# Patient Record
Sex: FEMALE | Race: WHITE | Employment: OTHER | RURAL
[De-identification: names, ages, dates, MRNs, and addresses within clinical notes are randomized per-mention and may not be internally consistent; named-entity substitution may affect disease eponyms.]

---

## 2017-02-10 ENCOUNTER — OFFICE VISIT (OUTPATIENT)
Dept: FAMILY MEDICINE CLINIC | Age: 57
End: 2017-02-10

## 2017-02-10 VITALS
RESPIRATION RATE: 22 BRPM | HEART RATE: 95 BPM | DIASTOLIC BLOOD PRESSURE: 62 MMHG | HEIGHT: 67 IN | SYSTOLIC BLOOD PRESSURE: 110 MMHG | BODY MASS INDEX: 37.35 KG/M2 | TEMPERATURE: 97.3 F | OXYGEN SATURATION: 98 % | WEIGHT: 238 LBS

## 2017-02-10 DIAGNOSIS — M25.50 ARTHRALGIA, UNSPECIFIED JOINT: ICD-10-CM

## 2017-02-10 DIAGNOSIS — E07.9 THYROID DISEASE: ICD-10-CM

## 2017-02-10 DIAGNOSIS — Z23 ENCOUNTER FOR IMMUNIZATION: Primary | ICD-10-CM

## 2017-02-10 DIAGNOSIS — G47.00 INSOMNIA, UNSPECIFIED TYPE: ICD-10-CM

## 2017-02-10 DIAGNOSIS — I10 ESSENTIAL HYPERTENSION: ICD-10-CM

## 2017-02-10 DIAGNOSIS — F32.A DEPRESSION, UNSPECIFIED DEPRESSION TYPE: ICD-10-CM

## 2017-02-10 RX ORDER — AMLODIPINE BESYLATE 5 MG/1
TABLET ORAL
Qty: 30 TAB | Refills: 6 | Status: SHIPPED | OUTPATIENT
Start: 2017-02-10 | End: 2017-10-11 | Stop reason: SDUPTHER

## 2017-02-10 RX ORDER — LISINOPRIL AND HYDROCHLOROTHIAZIDE 12.5; 2 MG/1; MG/1
2 TABLET ORAL DAILY
Qty: 60 TAB | Refills: 3 | Status: SHIPPED | OUTPATIENT
Start: 2017-02-10 | End: 2017-07-27 | Stop reason: SDUPTHER

## 2017-02-10 RX ORDER — ESCITALOPRAM OXALATE 20 MG/1
TABLET ORAL
Qty: 30 TAB | Refills: 0 | Status: SHIPPED | OUTPATIENT
Start: 2017-02-10 | End: 2017-02-22 | Stop reason: SDUPTHER

## 2017-02-10 RX ORDER — ATORVASTATIN CALCIUM 20 MG/1
20 TABLET, FILM COATED ORAL DAILY
Qty: 30 TAB | Refills: 0 | Status: SHIPPED | OUTPATIENT
Start: 2017-02-10 | End: 2017-05-11 | Stop reason: SDUPTHER

## 2017-02-10 RX ORDER — BUPROPION HYDROCHLORIDE 75 MG/1
75 TABLET ORAL 2 TIMES DAILY
Qty: 60 TAB | Refills: 0 | Status: SHIPPED | OUTPATIENT
Start: 2017-02-10 | End: 2017-06-09 | Stop reason: SDUPTHER

## 2017-02-10 RX ORDER — MELATONIN 5 MG
5 CAPSULE ORAL
Qty: 30 CAP | Refills: 3 | Status: SHIPPED | OUTPATIENT
Start: 2017-02-10

## 2017-02-10 NOTE — MR AVS SNAPSHOT
Visit Information Date & Time Provider Department Dept. Phone Encounter #  
 2/10/2017 11:00 AM Rubi Reyes NP [facility] [address] [facility] [phone] [id number] Upcoming Health Maintenance Date Due Hepatitis C Screening 1960 Pneumococcal 19-64 Medium Risk (1 of 1 - PPSV23) 12/12/1979 DTaP/Tdap/Td series (1 - Tdap) 12/12/1981 PAP AKA CERVICAL CYTOLOGY 12/12/1981 BREAST CANCER SCRN MAMMOGRAM 12/12/2010 FOBT Q 1 YEAR AGE 50-75 12/12/2010 INFLUENZA AGE 9 TO ADULT 8/1/2016 Allergies as of 2/10/2017  Review Complete On: 2/10/2017 By: Abdi Moyer RN No Known Allergies Current Immunizations  Never Reviewed Name Date Pneumococcal Conjugate (PCV-13) 2/10/2017 Tdap  Incomplete Not reviewed this visit You Were Diagnosed With   
  
 Codes Comments Encounter for immunization    -  Primary ICD-10-CM: Y63 ICD-9-CM: V03.89 Insomnia, unspecified type     ICD-10-CM: G47.00 ICD-9-CM: 780.52 Thyroid disease     ICD-10-CM: E07.9 ICD-9-CM: 246.9 Arthralgia, unspecified joint     ICD-10-CM: M25.50 ICD-9-CM: 719.40 Essential hypertension     ICD-10-CM: I10 
ICD-9-CM: 401.9 Depression, unspecified depression type     ICD-10-CM: F32.9 ICD-9-CM: 388 Vitals BP Pulse Temp Resp Height(growth percentile) 110/62 (BP 1 Location: Left arm, BP Patient Position: Sitting) 95 97.3 °F (36.3 °C) (Temporal) 22 5' 7\" (1.702 m) Weight(growth percentile) SpO2 BMI OB Status Smoking Status 238 lb (108 kg) 98% 37.28 kg/m2 Menopause Current Every Day Smoker BMI and BSA Data Body Mass Index Body Surface Area  
 37.28 kg/m 2 2.26 m 2 Preferred Pharmacy Pharmacy Name Phone [address], [facility] [place]so [place] [phone] Your Updated Medication List  
  
   
This list is accurate as of: 2/10/17 12:13 PM.  Always use your most recent med list.  
  
  
  
  
 amLODIPine 5 mg tablet Commonly known as:  Raffi Churchill TAKE ONE TABLET BY MOUTH EVERY DAY  
  
 atorvastatin 20 mg tablet Commonly known as:  LIPITOR Take 1 Tab by mouth daily. Needs follow up  
  
 buPROPion 75 mg tablet Commonly known as:  [facility] Take 1 Tab by mouth two (2) times a day. Needs follow up  
  
 escitalopram oxalate 20 mg tablet Commonly known as:  Jose Trudy TAKE ONE TABLET BY MOUTH EVERY DAY  
  
 lisinopril-hydroCHLOROthiazide 20-12.5 mg per tablet Commonly known as:  Michael Del Real Take 2 Tabs by mouth daily. melatonin 5 mg Cap capsule Take 1 Cap by mouth nightly. Indications: INSOMNIA Prescriptions Sent to Pharmacy Refills  
 melatonin 5 mg cap capsule 3 Sig: Take 1 Cap by mouth nightly. Indications: INSOMNIA Class: Normal  
 Pharmacy: [address] [place] [address] Ph #: [phone] Route: Oral  
 atorvastatin (LIPITOR) 20 mg tablet 0 Sig: Take 1 Tab by mouth daily. Needs follow up Class: Normal  
 Pharmacy: [address] [place] [address] Ph #: [phone] Route: Oral  
 buPROPion (WELLBUTRIN) 75 mg tablet 0 Sig: Take 1 Tab by mouth two (2) times a day. Needs follow up Class: Normal  
 Pharmacy: [address] [place] [address] Ph #: [phone] Route: Oral  
 escitalopram oxalate (LEXAPRO) 20 mg tablet 0 Sig: TAKE ONE TABLET BY MOUTH EVERY DAY Class: Normal  
 Pharmacy: [address] [place] [address] Ph #: [phone]  
 lisinopril-hydroCHLOROthiazide (PRINZIDE, ZESTORETIC) 20-12.5 mg per tablet 3 Sig: Take 2 Tabs by mouth daily. Class: Normal  
 Pharmacy: [address] [place] [address] Ph #: [phone] Route: Oral  
 amLODIPine (NORVASC) 5 mg tablet 6 Sig: TAKE ONE TABLET BY MOUTH EVERY DAY  Class: Normal  
 Pharmacy: [address] [facility], [address] [facility] #: [phone] We Performed the Following ADMIN PNEUMOCOCCAL VACCINE [ HCPCS] PNEUMOCOCCAL CONJ VACCINE 13 VALENT IM [id number] CPT(R)] TETANUS, DIPHTHERIA TOXOIDS AND ACELLULAR PERTUSSIS VACCINE (TDAP), IN INDIVIDS. >=7, IM [id number] CPT(R)] Introducing [facility]! Dru Adame introduces [employer] patient portal. Now you can access parts of your medical record, email your doctor's office, and request medication refills online. 1. In your internet browser, go to [email or web address] 2. Click on the First Time User? Click Here link in the Sign In box. You will see the New Member Sign Up page. 3. Enter your [employer] Access Code exactly as it appears below. You will not need to use this code after youve completed the sign-up process. If you do not sign up before the expiration date, you must request a new code. · [employer] Access Code: [email or web address] Expires: 5/11/2017 12:13 PM 
 
4. Enter the last four digits of your Social Security Number (xxxx) and Date of Birth (mm/dd/yyyy) as indicated and click Submit. You will be taken to the next sign-up page. 5. Create a [employer] ID. This will be your [employer] login ID and cannot be changed, so think of one that is secure and easy to remember. 6. Create a [employer] password. You can change your password at any time. 7. Enter your Password Reset Question and Answer. This can be used at a later time if you forget your password. 8. Enter your e-mail address. You will receive e-mail notification when new information is available in [address] 9. Click Sign Up. You can now view and download portions of your medical record. 10. Click the Download Summary menu link to download a portable copy of your medical information.  
 
If you have questions, please visit the Frequently Asked Questions section of the [employer]. Remember, [employer] is NOT to be used for urgent needs. For medical emergencies, dial 911. Now available from your iPhone and Android! Please provide this summary of care documentation to your next provider. Your primary care clinician is listed as Samm Weber. If you have any questions after today's visit, please call [phone].

## 2017-02-11 NOTE — PROGRESS NOTES
Subjective:     Saurabh To is a 64 y.o. female who presents today with the following:  Chief Complaint   Patient presents with    Hypertension     checkup    Thyroid Problem   HPI: Jeannette Giordano presents today follow up for chronic medical conditions. Compliant with medications. Insomnia: Hard to get to sleep ans stay asleep. Usually wakes up with knee pain. Hx of multiple surgeries and MRSA. Requests trazodone for sleep. We discussed melatonin.  referral for mammogram        referral for colonoscopy        Needs to schedule a pap          Wants pneumococcal and Tdap today    ROS:  Gen: denies fever, chills, fatigue, weight loss, weight gain  HEENT:denies blurry vision, nasal congestion, sore throat  Resp: denies dypsnea, cough, wheezing  CV: denies chest pain radiating to the jaws or arms, palpitations, lower extremity edema  Abd: denies nausea, vomiting, diarrhea, constipation  Neuro: denies numbness/tingling  Endo: denies polyuria, polydipsia, heat/cold intolerance  Heme: no lymphadenopathy    No Known Allergies      Current Outpatient Prescriptions:     melatonin 5 mg cap capsule, Take 1 Cap by mouth nightly. Indications: INSOMNIA, Disp: 30 Cap, Rfl: 3    atorvastatin (LIPITOR) 20 mg tablet, Take 1 Tab by mouth daily. Needs follow up, Disp: 30 Tab, Rfl: 0    buPROPion (WELLBUTRIN) 75 mg tablet, Take 1 Tab by mouth two (2) times a day. Needs follow up, Disp: 60 Tab, Rfl: 0    escitalopram oxalate (LEXAPRO) 20 mg tablet, TAKE ONE TABLET BY MOUTH EVERY DAY, Disp: 30 Tab, Rfl: 0    lisinopril-hydroCHLOROthiazide (PRINZIDE, ZESTORETIC) 20-12.5 mg per tablet, Take 2 Tabs by mouth daily. , Disp: 60 Tab, Rfl: 3    amLODIPine (NORVASC) 5 mg tablet, TAKE ONE TABLET BY MOUTH EVERY DAY, Disp: 30 Tab, Rfl: 6    Past Medical History   Diagnosis Date    Anxiety     Biceps tendon tear     Depression     Frequent headaches     Hypertension     Joint pain     Kidney stones     MRSA infection      knee    Palpitations     Thyroid disease      thyroid problem nodules on thyroid       Past Surgical History   Procedure Laterality Date    Hx  section       3 of them    Hx orthopaedic       torn knee left and right knee ,carpal tunnel    Hx orthopaedic       CTS, lt shoulder    Hx colonoscopy  2004     Pt report, polyps [facility]       History   Smoking Status    Current Every Day Smoker    Packs/day: 1.00    Types: Cigarettes   Smokeless Tobacco    Not on file       Social History     Social History    Marital status:      Spouse name: N/A    Number of children: N/A    Years of education: N/A     Social History Main Topics    Smoking status: Current Every Day Smoker     Packs/day: 1.00     Types: Cigarettes    Smokeless tobacco: Not on file    Alcohol use 0.0 oz/week     0 Standard drinks or equivalent per week      Comment: 5-6 drinks a week    Drug use: No    Sexual activity: Yes     Other Topics Concern    Not on file     Social History Narrative       Family History   Problem Relation Age of Onset    Cancer Mother     Hypertension Mother     Diabetes Father     Parkinson's Disease Father     Hypertension Brother     Hypertension Maternal Grandmother     Stroke Maternal Grandmother     Heart Disease Paternal Grandmother     Cancer Other      luekemia         Objective:     Visit Vitals    /62 (BP 1 Location: Left arm, BP Patient Position: Sitting)    Pulse 95    Temp 97.3 °F (36.3 °C) (Temporal)    Resp 22    Ht 5' 7\" (1.702 m)    Wt 238 lb (108 kg)    SpO2 98%    BMI 37.28 kg/m2     Body mass index is 37.28 kg/(m^2). General: Alert and oriented. No acute distress. Well nourished  HEENT :  Ears:TMs are normal. Canals are clear. Eyes: pupils equal, round, react to light and accommodation. Extra ocular movements intact. Nose: patent. Mouth and throat is clear. Neck:supple full range of motion no thyromegaly. Trachea midline, No carotid bruits. No significant lymphadenopathy  Lungs[de-identified] clear to auscultation without wheezes, rales, or rhonchi. Heart :RRR, S1 & S2 are normal intensity. No murmur; no gallop  Abdomen: bowel sounds active. No tenderness, guarding, rebound, masses, hepatic or spleen enlargement  Back: no CVA tenderness. Extremities: without clubbing, cyanosis, or edema  Pulses: radial and femoral pulses are normal  Neuro: HMF intact. Cranial nerves II through XII grossly normal.  Motor: is 5 over 5 and symmetrical.   Deep tendon reflexes: +2 equal    Results for orders placed or performed in visit on 07/26/16   TSH 3RD GENERATION   Result Value Ref Range    TSH 1.040 0.450 - 4.500 uIU/mL   LIPID PANEL   Result Value Ref Range    Cholesterol, total 192 100 - 199 mg/dL    Triglyceride 140 0 - 149 mg/dL    HDL Cholesterol 52 >39 mg/dL    VLDL, calculated 28 5 - 40 mg/dL    LDL, calculated 112 (H) 0 - 99 mg/dL   AST   Result Value Ref Range    AST (SGOT) 22 0 - 40 IU/L   SPECIMEN STATUS REPORT   Result Value Ref Range    SPECIMEN STATUS REPORT COMMENT        No results found for this visit on 02/10/17. Assessment/ Plan:     Jeannette Giordano was seen today for hypertension and thyroid problem. Diagnoses and all orders for this visit:    Encounter for immunization  -     PNEUMOCOCCAL CONJ VACCINE 13 VALENT IM  -     TETANUS, DIPHTHERIA TOXOIDS AND ACELLULAR PERTUSSIS VACCINE (TDAP), IN INDIVIDS. >=7, IM  -     ADMIN PNEUMOCOCCAL VACCINE    Insomnia, unspecified type    Thyroid disease    Arthralgia, unspecified joint    Essential hypertension    Depression, unspecified depression type    Other orders  -     melatonin 5 mg cap capsule; Take 1 Cap by mouth nightly. Indications: INSOMNIA  -     atorvastatin (LIPITOR) 20 mg tablet; Take 1 Tab by mouth daily. Needs follow up  -     buPROPion (WELLBUTRIN) 75 mg tablet; Take 1 Tab by mouth two (2) times a day.  Needs follow up  -     escitalopram oxalate (LEXAPRO) 20 mg tablet; TAKE ONE TABLET BY MOUTH EVERY DAY  -     lisinopril-hydroCHLOROthiazide (PRINZIDE, ZESTORETIC) 20-12.5 mg per tablet; Take 2 Tabs by mouth daily. -     amLODIPine (NORVASC) 5 mg tablet; TAKE ONE TABLET BY MOUTH EVERY DAY         1. Encounter for immunization    2. Insomnia, unspecified type    3. Thyroid disease    4. Arthralgia, unspecified joint    5. Essential hypertension    6. Depression, unspecified depression type        Orders Placed This Encounter    PNEUMOCOCCAL CONJ VACCINE 13 VALENT IM    TETANUS, DIPHTHERIA TOXOIDS AND ACELLULAR PERTUSSIS VACCINE (TDAP), IN INDIVIDS. >=7, IM    ADMIN PNEUMOCOCCAL VACCINE    melatonin 5 mg cap capsule     Sig: Take 1 Cap by mouth nightly. Indications: INSOMNIA     Dispense:  30 Cap     Refill:  3    atorvastatin (LIPITOR) 20 mg tablet     Sig: Take 1 Tab by mouth daily. Needs follow up     Dispense:  30 Tab     Refill:  0    buPROPion (WELLBUTRIN) 75 mg tablet     Sig: Take 1 Tab by mouth two (2) times a day. Needs follow up     Dispense:  60 Tab     Refill:  0    escitalopram oxalate (LEXAPRO) 20 mg tablet     Sig: TAKE ONE TABLET BY MOUTH EVERY DAY     Dispense:  30 Tab     Refill:  0    lisinopril-hydroCHLOROthiazide (PRINZIDE, ZESTORETIC) 20-12.5 mg per tablet     Sig: Take 2 Tabs by mouth daily. Dispense:  60 Tab     Refill:  3    amLODIPine (NORVASC) 5 mg tablet     Sig: TAKE ONE TABLET BY MOUTH EVERY DAY     Dispense:  30 Tab     Refill:  6     Insomnia discussed trying melatonin at night. Verbal and written instructions (see AVS) provided.  Patient expresses understanding of diagnosis and treatment plan.     JERRELL Nova

## 2017-02-22 RX ORDER — ESCITALOPRAM OXALATE 20 MG/1
TABLET ORAL
Qty: 30 TAB | Refills: 0 | Status: SHIPPED | OUTPATIENT
Start: 2017-02-22 | End: 2017-02-28 | Stop reason: SDUPTHER

## 2017-02-28 RX ORDER — ESCITALOPRAM OXALATE 20 MG/1
TABLET ORAL
Qty: 30 TAB | Refills: 0 | Status: SHIPPED | OUTPATIENT
Start: 2017-02-28 | End: 2017-06-02 | Stop reason: SDUPTHER

## 2017-04-21 ENCOUNTER — HOSPITAL ENCOUNTER (OUTPATIENT)
Dept: LAB | Age: 57
Discharge: HOME | End: 2017-04-21
Payer: COMMERCIAL

## 2017-04-21 ENCOUNTER — OFFICE VISIT (OUTPATIENT)
Dept: FAMILY MEDICINE CLINIC | Age: 57
End: 2017-04-21

## 2017-04-21 VITALS
SYSTOLIC BLOOD PRESSURE: 114 MMHG | HEIGHT: 67 IN | HEART RATE: 78 BPM | TEMPERATURE: 97.8 F | WEIGHT: 235.4 LBS | BODY MASS INDEX: 36.95 KG/M2 | DIASTOLIC BLOOD PRESSURE: 66 MMHG | RESPIRATION RATE: 20 BRPM

## 2017-04-21 DIAGNOSIS — Z00.00 HEALTH MAINTENANCE EXAMINATION: ICD-10-CM

## 2017-04-21 DIAGNOSIS — Z01.419 WOMEN'S ANNUAL ROUTINE GYNECOLOGICAL EXAMINATION: Primary | ICD-10-CM

## 2017-04-21 PROCEDURE — 88175 CYTOPATH C/V AUTO FLUID REDO: CPT | Performed by: NURSE PRACTITIONER

## 2017-04-21 PROCEDURE — 87624 HPV HI-RISK TYP POOLED RSLT: CPT | Performed by: NURSE PRACTITIONER

## 2017-04-21 NOTE — PROGRESS NOTES
Subjective:   64 y.o. female for Well Woman Check. No LMP recorded. Patient is not currently having periods (Reason: Menopause). Social History: has sex with males. Pertinent past medical hstory: HTN, Thyroid Disease and anxiety. Patient Active Problem List    Diagnosis Date Noted    Depression     Hypertension     Joint pain     Anxiety     Thyroid disease      Current Outpatient Prescriptions   Medication Sig Dispense Refill    escitalopram oxalate (LEXAPRO) 20 mg tablet TAKE ONE TABLET BY MOUTH EVERY DAY 30 Tab 0    melatonin 5 mg cap capsule Take 1 Cap by mouth nightly. Indications: INSOMNIA 30 Cap 3    atorvastatin (LIPITOR) 20 mg tablet Take 1 Tab by mouth daily. Needs follow up 30 Tab 0    buPROPion (WELLBUTRIN) 75 mg tablet Take 1 Tab by mouth two (2) times a day. Needs follow up 60 Tab 0    lisinopril-hydroCHLOROthiazide (PRINZIDE, ZESTORETIC) 20-12.5 mg per tablet Take 2 Tabs by mouth daily. 60 Tab 3    amLODIPine (NORVASC) 5 mg tablet TAKE ONE TABLET BY MOUTH EVERY DAY 30 Tab 6     No Known Allergies  Past Medical History:   Diagnosis Date    Anxiety     Biceps tendon tear     Depression     Frequent headaches     Hypertension     Joint pain     Kidney stones     MRSA infection     knee    Palpitations     Thyroid disease     thyroid problem nodules on thyroid     Past Surgical History:   Procedure Laterality Date    HX  SECTION      3 of them    HX COLONOSCOPY  2004    Pt report, polyps [facility]    HX ORTHOPAEDIC      torn knee left and right knee ,carpal tunnel    HX ORTHOPAEDIC      CTS, lt shoulder     Family History   Problem Relation Age of Onset    Cancer Mother     Hypertension Mother     Diabetes Father     Parkinson's Disease Father     Hypertension Brother     Hypertension Maternal Grandmother     Stroke Maternal Grandmother     Heart Disease Paternal Grandmother     Cancer Other      luekemia        ROS:  Feeling well.  No dyspnea or chest pain on exertion. No abdominal pain, change in bowel habits, black or bloody stools. No urinary tract symptoms. GYN ROS: no breast pain or new or enlarging lumps on self exam, no vaginal bleeding. No neurological complaints. Objective:     Visit Vitals    /66 (BP 1 Location: Right arm, BP Patient Position: Sitting)    Pulse 78    Temp 97.8 °F (36.6 °C) (Temporal)    Resp 20    Ht 5' 7\" (1.702 m)    Wt 235 lb 6.4 oz (106.8 kg)    BMI 36.87 kg/m2     The patient appears well, alert, oriented x 3, in no distress. ENT normal.  Neck supple. No adenopathy or thyromegaly. DEA. Lungs are clear, good air entry, no wheezes, rhonchi or rales. S1 and S2 normal, no murmurs, regular rate and rhythm. Abdomen soft without tenderness, guarding, mass or organomegaly. Extremities show no edema, normal peripheral pulses. Neurological is normal, no focal findings. BREAST EXAM: breasts appear normal, no suspicious masses, no skin or nipple changes or axillary nodes    PELVIC EXAM: normal external genitalia, vulva, vagina, cervix, uterus and adnexa    Assessment/Plan:   well woman  pap smear  counseled on breast self exam, menopause, osteoporosis and adequate intake of calcium and vitamin D  return annually or prn  Encounter Diagnoses   Name Primary?  Women's annual routine gynecological examination Yes    Health maintenance examination      Orders Placed This Encounter    OCCULT BLOOD, IMMUNOASSAY (FIT)    PAP IG, APTIMA HPV AND RFX 16/18,45 ([id number])   . RTO in 6 months for chronic disease evaluation and management.

## 2017-04-21 NOTE — MR AVS SNAPSHOT
Visit Information Date & Time Provider Department Dept. Phone Encounter #  
 4/21/2017  8:00 AM Sofie Snellen, NP [address] [phone] [id number] Upcoming Health Maintenance Date Due Hepatitis C Screening 1960 Pneumococcal 19-64 Medium Risk (1 of 1 - PPSV23) 12/12/1979 PAP AKA CERVICAL CYTOLOGY 12/12/1981 BREAST CANCER SCRN MAMMOGRAM 12/12/2010 FOBT Q 1 YEAR AGE 50-75 12/12/2010 INFLUENZA AGE 9 TO ADULT 8/1/2016 DTaP/Tdap/Td series (2 - Td) 2/10/2027 Allergies as of 4/21/2017  Review Complete On: 4/21/2017 By: Sofie Snellen, NP No Known Allergies Current Immunizations  Never Reviewed Name Date Pneumococcal Conjugate (PCV-13) 2/10/2017 Tdap 2/10/2017 Not reviewed this visit You Were Diagnosed With   
  
 Codes Comments Women's annual routine gynecological examination    -  Primary ICD-10-CM: W20.999 ICD-9-CM: V72.31 Vitals BP Pulse Temp Resp Height(growth percentile) 114/66 (BP 1 Location: Right arm, BP Patient Position: Sitting) 78 97.8 °F (36.6 °C) (Temporal) 20 5' 7\" (1.702 m) Weight(growth percentile) BMI OB Status Smoking Status 235 lb 6.4 oz (106.8 kg) 36.87 kg/m2 Menopause Current Every Day Smoker Vitals History BMI and BSA Data Body Mass Index Body Surface Area  
 36.87 kg/m 2 2.25 m 2 Preferred Pharmacy Pharmacy Name Phone [address], [facility] [place] Al[place] [phone] Your Updated Medication List  
  
   
This list is accurate as of: 4/21/17  8:34 AM.  Always use your most recent med list. amLODIPine 5 mg tablet Commonly known as:  Sameul Felicia TAKE ONE TABLET BY MOUTH EVERY DAY  
  
 atorvastatin 20 mg tablet Commonly known as:  LIPITOR Take 1 Tab by mouth daily. Needs follow up  
  
 buPROPion 75 mg tablet Commonly known as:  [facility]  
 Take 1 Tab by mouth two (2) times a day. Needs follow up  
  
 escitalopram oxalate 20 mg tablet Commonly known as:  Aleatha Bernheim TAKE ONE TABLET BY MOUTH EVERY DAY  
  
 lisinopril-hydroCHLOROthiazide 20-12.5 mg per tablet Commonly known as:  Juhia  Take 2 Tabs by mouth daily. melatonin 5 mg Cap capsule Take 1 Cap by mouth nightly. Indications: INSOMNIA We Performed the Following PAP IG, APTIMA HPV AND RFX 16/18,45 ([id number]) [[id number] Custom] Introducing [facility]! [facility] introduces [employer] patient portal. Now you can access parts of your medical record, email your doctor's office, and request medication refills online. 1. In your internet browser, go to [email or web address] 2. Click on the First Time User? Click Here link in the Sign In box. You will see the New Member Sign Up page. 3. Enter your [employer] Access Code exactly as it appears below. You will not need to use this code after youve completed the sign-up process. If you do not sign up before the expiration date, you must request a new code. · [employer] Access Code: [email or web address] Expires: 5/11/2017  1:13 PM 
 
4. Enter the last four digits of your Social Security Number (xxxx) and Date of Birth (mm/dd/yyyy) as indicated and click Submit. You will be taken to the next sign-up page. 5. Create a [employer] ID. This will be your [employer] login ID and cannot be changed, so think of one that is secure and easy to remember. 6. Create a [employer] password. You can change your password at any time. 7. Enter your Password Reset Question and Answer. This can be used at a later time if you forget your password. 8. Enter your e-mail address. You will receive e-mail notification when new information is available in [address] 9. Click Sign Up. You can now view and download portions of your medical record.  
10. Click the Download Summary menu link to download a portable copy of your medical information. If you have questions, please visit the Frequently Asked Questions section of the [employer] website. Remember, [place] [facility] is NOT to be used for urgent needs. For medical emergencies, dial 911. Now available from your iPhone and Android! Please provide this summary of care documentation to your next provider. Your primary care clinician is listed as Jessica Holliday. If you have any questions after today's visit, please call [phone].

## 2017-04-22 LAB — HEMOCCULT STL QL IA: NEGATIVE

## 2017-05-12 RX ORDER — ATORVASTATIN CALCIUM 20 MG/1
20 TABLET, FILM COATED ORAL DAILY
Qty: 30 TAB | Refills: 0 | Status: SHIPPED | OUTPATIENT
Start: 2017-05-12 | End: 2017-06-09 | Stop reason: SDUPTHER

## 2017-06-02 RX ORDER — ESCITALOPRAM OXALATE 20 MG/1
TABLET ORAL
Qty: 30 TAB | Refills: 0 | Status: SHIPPED | OUTPATIENT
Start: 2017-06-02 | End: 2017-06-26 | Stop reason: SDUPTHER

## 2017-06-09 RX ORDER — BUPROPION HYDROCHLORIDE 75 MG/1
75 TABLET ORAL 2 TIMES DAILY
Qty: 180 TAB | Refills: 1 | Status: SHIPPED | OUTPATIENT
Start: 2017-06-09 | End: 2017-11-27 | Stop reason: SDUPTHER

## 2017-06-09 RX ORDER — ATORVASTATIN CALCIUM 20 MG/1
20 TABLET, FILM COATED ORAL DAILY
Qty: 90 TAB | Refills: 1 | Status: SHIPPED | OUTPATIENT
Start: 2017-06-09 | End: 2017-11-27 | Stop reason: SDUPTHER

## 2017-06-09 NOTE — TELEPHONE ENCOUNTER
Lab Results   Component Value Date/Time    Cholesterol, total 192 07/26/2016 10:31 AM    HDL Cholesterol 52 07/26/2016 10:31 AM    LDL, calculated 112 07/26/2016 10:31 AM    VLDL, calculated 28 07/26/2016 10:31 AM    Triglyceride 140 07/26/2016 10:31 AM

## 2017-06-26 RX ORDER — ESCITALOPRAM OXALATE 20 MG/1
TABLET ORAL
Qty: 30 TAB | Refills: 3 | Status: SHIPPED | OUTPATIENT
Start: 2017-06-26 | End: 2017-10-31 | Stop reason: SDUPTHER

## 2017-07-27 RX ORDER — LISINOPRIL AND HYDROCHLOROTHIAZIDE 12.5; 2 MG/1; MG/1
2 TABLET ORAL DAILY
Qty: 60 TAB | Refills: 3 | Status: SHIPPED | OUTPATIENT
Start: 2017-07-27 | End: 2017-11-22 | Stop reason: SDUPTHER

## 2017-10-11 RX ORDER — AMLODIPINE BESYLATE 5 MG/1
TABLET ORAL
Qty: 30 TAB | Refills: 0 | Status: SHIPPED | OUTPATIENT
Start: 2017-10-11 | End: 2017-11-09 | Stop reason: SDUPTHER

## 2017-11-09 RX ORDER — AMLODIPINE BESYLATE 5 MG/1
TABLET ORAL
Qty: 30 TAB | Refills: 0 | Status: SHIPPED | OUTPATIENT
Start: 2017-11-09 | End: 2017-11-27 | Stop reason: SDUPTHER

## 2017-11-24 RX ORDER — LISINOPRIL AND HYDROCHLOROTHIAZIDE 12.5; 2 MG/1; MG/1
TABLET ORAL
Qty: 60 TAB | Refills: 0 | Status: SHIPPED | OUTPATIENT
Start: 2017-11-24 | End: 2017-11-27 | Stop reason: SDUPTHER

## 2017-11-27 ENCOUNTER — OFFICE VISIT (OUTPATIENT)
Dept: FAMILY MEDICINE CLINIC | Age: 57
End: 2017-11-27

## 2017-11-27 VITALS
TEMPERATURE: 96.9 F | DIASTOLIC BLOOD PRESSURE: 70 MMHG | WEIGHT: 233.4 LBS | BODY MASS INDEX: 36.63 KG/M2 | RESPIRATION RATE: 16 BRPM | HEART RATE: 73 BPM | OXYGEN SATURATION: 96 % | SYSTOLIC BLOOD PRESSURE: 106 MMHG | HEIGHT: 67 IN

## 2017-11-27 DIAGNOSIS — E07.9 THYROID DISEASE: ICD-10-CM

## 2017-11-27 DIAGNOSIS — I10 ESSENTIAL HYPERTENSION: Primary | ICD-10-CM

## 2017-11-27 DIAGNOSIS — Z12.39 SCREENING FOR BREAST CANCER: ICD-10-CM

## 2017-11-27 DIAGNOSIS — Z23 ENCOUNTER FOR IMMUNIZATION: ICD-10-CM

## 2017-11-27 RX ORDER — ATORVASTATIN CALCIUM 20 MG/1
20 TABLET, FILM COATED ORAL DAILY
Qty: 90 TAB | Refills: 1 | Status: SHIPPED | OUTPATIENT
Start: 2017-11-27 | End: 2018-06-08 | Stop reason: SDUPTHER

## 2017-11-27 RX ORDER — AMLODIPINE BESYLATE 5 MG/1
TABLET ORAL
Qty: 90 TAB | Refills: 1 | Status: SHIPPED | OUTPATIENT
Start: 2017-11-27 | End: 2018-06-08 | Stop reason: SDUPTHER

## 2017-11-27 RX ORDER — ESCITALOPRAM OXALATE 20 MG/1
TABLET ORAL
Qty: 90 TAB | Refills: 1 | Status: SHIPPED | OUTPATIENT
Start: 2017-11-27 | End: 2018-06-28 | Stop reason: SDUPTHER

## 2017-11-27 RX ORDER — LISINOPRIL AND HYDROCHLOROTHIAZIDE 12.5; 2 MG/1; MG/1
TABLET ORAL
Qty: 180 TAB | Refills: 1 | Status: SHIPPED | OUTPATIENT
Start: 2017-11-27 | End: 2018-06-25 | Stop reason: SDUPTHER

## 2017-11-27 RX ORDER — BUPROPION HYDROCHLORIDE 75 MG/1
75 TABLET ORAL 2 TIMES DAILY
Qty: 180 TAB | Refills: 1 | Status: SHIPPED | OUTPATIENT
Start: 2017-11-27 | End: 2018-11-30 | Stop reason: SDUPTHER

## 2017-11-27 NOTE — MR AVS SNAPSHOT
Visit Information Date & Time Provider Department Dept. Phone Encounter #  
 11/27/2017 11:30 AM Selena Lange NP [place] [id number] [facility] [phone] [id number] Upcoming Health Maintenance Date Due Hepatitis C Screening 1960 Pneumococcal 19-64 Medium Risk (1 of 1 - PPSV23) 12/12/1979 BREAST CANCER SCRN MAMMOGRAM 12/12/2010 FOBT Q 1 YEAR AGE 50-75 4/21/2018 PAP AKA CERVICAL CYTOLOGY 4/21/2020 DTaP/Tdap/Td series (2 - Td) 2/10/2027 Allergies as of 11/27/2017  Review Complete On: 11/27/2017 By: Oni Griffith RN No Known Allergies Current Immunizations  Never Reviewed Name Date Influenza Vaccine Vicky Lanronak) 11/27/2017 Pneumococcal Conjugate (PCV-13) 2/10/2017 Tdap 2/10/2017 Not reviewed this visit You Were Diagnosed With   
  
 Codes Comments Encounter for immunization    -  Primary ICD-10-CM: G11 ICD-9-CM: V03.89 BMI 36.0-36.9,adult     ICD-10-CM: B11.47 
ICD-9-CM: V85.36 Vitals BP Pulse Temp Resp Height(growth percentile) 106/70 (BP 1 Location: Left arm, BP Patient Position: Sitting) 73 96.9 °F (36.1 °C) (Temporal) 16 5' 7\" (1.702 m) Weight(growth percentile) SpO2 BMI OB Status Smoking Status 233 lb 6.4 oz (105.9 kg) 96% 36.56 kg/m2 Menopause Current Every Day Smoker BMI and BSA Data Body Mass Index Body Surface Area  
 36.56 kg/m 2 2.24 m 2 Preferred Pharmacy Pharmacy Name Phone [address], [facility] [place] [facility] [phone] Your Updated Medication List  
  
   
This list is accurate as of: 11/27/17 12:48 PM.  Always use your most recent med list. amLODIPine 5 mg tablet Commonly known as:  Wandra Tessie TAKE ONE TABLET BY MOUTH EVERY DAY  
  
 atorvastatin 20 mg tablet Commonly known as:  LIPITOR Take 1 Tab by mouth daily. buPROPion 75 mg tablet Commonly known as:  [facility]  
 Take 1 Tab by mouth two (2) times a day. escitalopram oxalate 20 mg tablet Commonly known as:  Junior Mcpherson TAKE ONE TABLET BY MOUTH EVERY DAY  
  
 lisinopril-hydroCHLOROthiazide 20-12.5 mg per tablet Commonly known as:  PRINZIDE, ZESTORETIC  
TAKE TWO TABLETS BY MOUTH EVERY DAY  
  
 melatonin 5 mg Cap capsule Take 1 Cap by mouth nightly. Indications: INSOMNIA Prescriptions Sent to Pharmacy Refills  
 lisinopril-hydroCHLOROthiazide (PRINZIDE, ZESTORETIC) 20-12.5 mg per tablet 1 Sig: TAKE TWO TABLETS BY MOUTH EVERY DAY Class: Normal  
 Pharmacy: [address] [place] [address] Ph #: [phone]  
 amLODIPine (NORVASC) 5 mg tablet 1 Sig: TAKE ONE TABLET BY MOUTH EVERY DAY Class: Normal  
 Pharmacy: [address] [place] [address] Ph #: [phone]  
 escitalopram oxalate (LEXAPRO) 20 mg tablet 1 Sig: TAKE ONE TABLET BY MOUTH EVERY DAY Class: Normal  
 Pharmacy: [address] [place] [address] Ph #: [phone]  
 buPROPion (WELLBUTRIN) 75 mg tablet 1 Sig: Take 1 Tab by mouth two (2) times a day. Class: Normal  
 Pharmacy: [address] [place] [address] Ph #: [phone] Route: Oral  
 atorvastatin (LIPITOR) 20 mg tablet 1 Sig: Take 1 Tab by mouth daily. Class: Normal  
 Pharmacy: [address] [place] [address] Ph #: [phone] Route: Oral  
  
We Performed the Following INFLUENZA VACCINE QUADRIVALENT VIAL, SPLIT, 3 YRS PLUS IM [id number] CPT(R)] DC IMMUNIZ ADMIN,1 SINGLE/COMB VAC/TOXOID [id number] CPT(R)] Introducing [facility]! Karin Connor introduces [employer] patient portal. Now you can access parts of your medical record, email your doctor's office, and request medication refills online.    
 
1. In your internet browser, go to [email or web address] 2. Click on the First Time User? Click Here link in the Sign In box. You will see the New Member Sign Up page. 3. Enter your fromAtoB Access Code exactly as it appears below. You will not need to use this code after youve completed the sign-up process. If you do not sign up before the expiration date, you must request a new code. · fromAtoB Access Code: [email or web address] Expires: 2/25/2018 12:47 PM 
 
4. Enter the last four digits of your Social Security Number (xxxx) and Date of Birth (mm/dd/yyyy) as indicated and click Submit. You will be taken to the next sign-up page. 5. Create a Social Tablest ID. This will be your fromAtoB login ID and cannot be changed, so think of one that is secure and easy to remember. 6. Create a fromAtoB password. You can change your password at any time. 7. Enter your Password Reset Question and Answer. This can be used at a later time if you forget your password. 8. Enter your e-mail address. You will receive e-mail notification when new information is available in [address] 9. Click Sign Up. You can now view and download portions of your medical record. 10. Click the Download Summary menu link to download a portable copy of your medical information. If you have questions, please visit the Frequently Asked Questions section of the fromAtoB website. Remember, fromAtoB is NOT to be used for urgent needs. For medical emergencies, dial 911. Now available from your iPhone and Android! Please provide this summary of care documentation to your next provider. Your primary care clinician is listed as Yann Fields. If you have any questions after today's visit, please call [phone].

## 2017-11-28 ENCOUNTER — CLINICAL SUPPORT (OUTPATIENT)
Dept: FAMILY MEDICINE CLINIC | Age: 57
End: 2017-11-28

## 2017-11-28 DIAGNOSIS — I10 HYPERTENSION, UNSPECIFIED TYPE: Primary | ICD-10-CM

## 2017-11-29 LAB
ALBUMIN SERPL-MCNC: 4.2 G/DL (ref 3.5–5.5)
ALBUMIN/GLOB SERPL: 1.8 {RATIO} (ref 1.2–2.2)
ALP SERPL-CCNC: 76 IU/L (ref 39–117)
ALT SERPL-CCNC: 15 IU/L (ref 0–32)
AST SERPL-CCNC: 12 IU/L (ref 0–40)
BASOPHILS # BLD AUTO: 0 X10E3/UL (ref 0–0.2)
BASOPHILS NFR BLD AUTO: 0 %
BILIRUB SERPL-MCNC: 0.3 MG/DL (ref 0–1.2)
BUN SERPL-MCNC: 14 MG/DL (ref 6–24)
BUN/CREAT SERPL: 24 (ref 9–23)
CALCIUM SERPL-MCNC: 9.8 MG/DL (ref 8.7–10.2)
CHLORIDE SERPL-SCNC: 98 MMOL/L (ref 96–106)
CHOLEST SERPL-MCNC: 182 MG/DL (ref 100–199)
CO2 SERPL-SCNC: 26 MMOL/L (ref 18–29)
CREAT SERPL-MCNC: 0.59 MG/DL (ref 0.57–1)
EOSINOPHIL # BLD AUTO: 0.2 X10E3/UL (ref 0–0.4)
EOSINOPHIL NFR BLD AUTO: 2 %
ERYTHROCYTE [DISTWIDTH] IN BLOOD BY AUTOMATED COUNT: 15.6 % (ref 12.3–15.4)
GFR SERPLBLD CREATININE-BSD FMLA CKD-EPI: 103 ML/MIN/1.73
GFR SERPLBLD CREATININE-BSD FMLA CKD-EPI: 118 ML/MIN/1.73
GLOBULIN SER CALC-MCNC: 2.3 G/DL (ref 1.5–4.5)
GLUCOSE SERPL-MCNC: 133 MG/DL (ref 65–99)
HCT VFR BLD AUTO: 40.5 % (ref 34–46.6)
HCV AB S/CO SERPL IA: <0.1 S/CO RATIO (ref 0–0.9)
HDLC SERPL-MCNC: 45 MG/DL
HGB BLD-MCNC: 13 G/DL (ref 11.1–15.9)
IMM GRANULOCYTES # BLD: 0.1 X10E3/UL (ref 0–0.1)
IMM GRANULOCYTES NFR BLD: 1 %
LDLC SERPL CALC-MCNC: 113 MG/DL (ref 0–99)
LYMPHOCYTES # BLD AUTO: 3.2 X10E3/UL (ref 0.7–3.1)
LYMPHOCYTES NFR BLD AUTO: 30 %
MCH RBC QN AUTO: 25.9 PG (ref 26.6–33)
MCHC RBC AUTO-ENTMCNC: 32.1 G/DL (ref 31.5–35.7)
MCV RBC AUTO: 81 FL (ref 79–97)
MONOCYTES # BLD AUTO: 0.6 X10E3/UL (ref 0.1–0.9)
MONOCYTES NFR BLD AUTO: 6 %
NEUTROPHILS # BLD AUTO: 6.4 X10E3/UL (ref 1.4–7)
NEUTROPHILS NFR BLD AUTO: 61 %
PLATELET # BLD AUTO: 297 X10E3/UL (ref 150–379)
POTASSIUM SERPL-SCNC: 4.7 MMOL/L (ref 3.5–5.2)
PROT SERPL-MCNC: 6.5 G/DL (ref 6–8.5)
RBC # BLD AUTO: 5.01 X10E6/UL (ref 3.77–5.28)
SODIUM SERPL-SCNC: 141 MMOL/L (ref 134–144)
TRIGL SERPL-MCNC: 118 MG/DL (ref 0–149)
TSH SERPL DL<=0.005 MIU/L-ACNC: 0.73 UIU/ML (ref 0.45–4.5)
VLDLC SERPL CALC-MCNC: 24 MG/DL (ref 5–40)
WBC # BLD AUTO: 10.6 X10E3/UL (ref 3.4–10.8)

## 2017-11-29 NOTE — PROGRESS NOTES
Good news  Hep Screening negative  Lipid panel cholesterol essentially normal  Thyroid level WNL no changes to medical plan. CBC WBC WNL  Very mild anemia resolving.

## 2017-11-30 NOTE — PROGRESS NOTES
Subjective:     Fernandez Ortiz is a 64 y.o. female who presents today with the following:  Chief Complaint   Patient presents with    Hypertension     checkup    Cholesterol Problem   Doing well no interval problems or concerns. Request lab work put in for future visit. COMPLIANT WITH MEDICATION:   HTN; Denies chest pain, dyspnea, palpitations, headache and blurred vision. Blood pressure normotensive. ROS:  Gen: denies fever, chills, fatigue, weight loss, weight gain  HEENT:denies blurry vision, nasal congestion, sore throat  Resp: denies dypsnea, cough, wheezing  CV: denies chest pain radiating to the jaws or arms, palpitations, lower extremity edema  Abd: denies nausea, vomiting, diarrhea, constipation  Neuro: denies numbness/tingling  Endo: denies polyuria, polydipsia, heat/cold intolerance  Heme: no lymphadenopathy    No Known Allergies      Current Outpatient Prescriptions:     lisinopril-hydroCHLOROthiazide (PRINZIDE, ZESTORETIC) 20-12.5 mg per tablet, TAKE TWO TABLETS BY MOUTH EVERY DAY, Disp: 180 Tab, Rfl: 1    amLODIPine (NORVASC) 5 mg tablet, TAKE ONE TABLET BY MOUTH EVERY DAY, Disp: 90 Tab, Rfl: 1    escitalopram oxalate (LEXAPRO) 20 mg tablet, TAKE ONE TABLET BY MOUTH EVERY DAY, Disp: 90 Tab, Rfl: 1    buPROPion (WELLBUTRIN) 75 mg tablet, Take 1 Tab by mouth two (2) times a day., Disp: 180 Tab, Rfl: 1    atorvastatin (LIPITOR) 20 mg tablet, Take 1 Tab by mouth daily. , Disp: 90 Tab, Rfl: 1    melatonin 5 mg cap capsule, Take 1 Cap by mouth nightly.  Indications: INSOMNIA, Disp: 30 Cap, Rfl: 3    Past Medical History:   Diagnosis Date    Anxiety     Biceps tendon tear     Depression     Frequent headaches     Hypertension     Joint pain     Kidney stones     MRSA infection     knee    Palpitations     Thyroid disease     thyroid problem nodules on thyroid       Past Surgical History:   Procedure Laterality Date    HX  SECTION      3 of them    HX COLONOSCOPY  2004 guarding, rebound, masses, hepatic or spleen enlargement  Back: no CVA tenderness. Extremities: without clubbing, cyanosis, or edema  Pulses: radial and femoral pulses are normal  Neuro: HMF intact. Cranial nerves II through XII grossly normal.  Motor: is 5 over 5 and symmetrical.   Deep tendon reflexes: +2 equal    Results for orders placed or performed in visit on 11/27/17   CBC WITH AUTOMATED DIFF   Result Value Ref Range    WBC 10.6 3.4 - 10.8 x10E3/uL    RBC 5.01 3.77 - 5.28 x10E6/uL    HGB 13.0 11.1 - 15.9 g/dL    HCT 40.5 34.0 - 46.6 %    MCV 81 79 - 97 fL    MCH 25.9 (L) 26.6 - 33.0 pg    MCHC 32.1 31.5 - 35.7 g/dL    RDW 15.6 (H) 12.3 - 15.4 %    PLATELET 161 782 - 033 x10E3/uL    NEUTROPHILS 61 Not Estab. %    Lymphocytes 30 Not Estab. %    MONOCYTES 6 Not Estab. %    EOSINOPHILS 2 Not Estab. %    BASOPHILS 0 Not Estab. %    ABS. NEUTROPHILS 6.4 1.4 - 7.0 x10E3/uL    Abs Lymphocytes 3.2 (H) 0.7 - 3.1 x10E3/uL    ABS. MONOCYTES 0.6 0.1 - 0.9 x10E3/uL    ABS. EOSINOPHILS 0.2 0.0 - 0.4 x10E3/uL    ABS. BASOPHILS 0.0 0.0 - 0.2 x10E3/uL    IMMATURE GRANULOCYTES 1 Not Estab. %    ABS. IMM.  GRANS. 0.1 0.0 - 0.1 x10E3/uL   LIPID PANEL   Result Value Ref Range    Cholesterol, total 182 100 - 199 mg/dL    Triglyceride 118 0 - 149 mg/dL    HDL Cholesterol 45 >39 mg/dL    VLDL, calculated 24 5 - 40 mg/dL    LDL, calculated 113 (H) 0 - 99 mg/dL   METABOLIC PANEL, COMPREHENSIVE   Result Value Ref Range    Glucose 133 (H) 65 - 99 mg/dL    BUN 14 6 - 24 mg/dL    Creatinine 0.59 0.57 - 1.00 mg/dL    GFR est non- >59 mL/min/1.73    GFR est  >59 mL/min/1.73    BUN/Creatinine ratio 24 (H) 9 - 23    Sodium 141 134 - 144 mmol/L    Potassium 4.7 3.5 - 5.2 mmol/L    Chloride 98 96 - 106 mmol/L    CO2 26 18 - 29 mmol/L    Calcium 9.8 8.7 - 10.2 mg/dL    Protein, total 6.5 6.0 - 8.5 g/dL    Albumin 4.2 3.5 - 5.5 g/dL    GLOBULIN, TOTAL 2.3 1.5 - 4.5 g/dL    A-G Ratio 1.8 1.2 - 2.2    Bilirubin, total 0.3 0.0 - 1.2 mg/dL    Alk. phosphatase 76 39 - 117 IU/L    AST (SGOT) 12 0 - 40 IU/L    ALT (SGPT) 15 0 - 32 IU/L   HEPATITIS C AB   Result Value Ref Range    Hep C Virus Ab <0.1 0.0 - 0.9 s/co ratio   TSH 3RD GENERATION   Result Value Ref Range    TSH 0.728 0.450 - 4.500 uIU/mL       Results for orders placed or performed in visit on 11/27/17   CBC WITH AUTOMATED DIFF   Result Value Ref Range    WBC 10.6 3.4 - 10.8 x10E3/uL    RBC 5.01 3.77 - 5.28 x10E6/uL    HGB 13.0 11.1 - 15.9 g/dL    HCT 40.5 34.0 - 46.6 %    MCV 81 79 - 97 fL    MCH 25.9 (L) 26.6 - 33.0 pg    MCHC 32.1 31.5 - 35.7 g/dL    RDW 15.6 (H) 12.3 - 15.4 %    PLATELET 690 605 - 592 x10E3/uL    NEUTROPHILS 61 Not Estab. %    Lymphocytes 30 Not Estab. %    MONOCYTES 6 Not Estab. %    EOSINOPHILS 2 Not Estab. %    BASOPHILS 0 Not Estab. %    ABS. NEUTROPHILS 6.4 1.4 - 7.0 x10E3/uL    Abs Lymphocytes 3.2 (H) 0.7 - 3.1 x10E3/uL    ABS. MONOCYTES 0.6 0.1 - 0.9 x10E3/uL    ABS. EOSINOPHILS 0.2 0.0 - 0.4 x10E3/uL    ABS. BASOPHILS 0.0 0.0 - 0.2 x10E3/uL    IMMATURE GRANULOCYTES 1 Not Estab. %    ABS. IMM.  GRANS. 0.1 0.0 - 0.1 x10E3/uL    Narrative    Performed at:  [address]  [id number]  : Jag Major MD, Phone:  [phone]   LIPID PANEL   Result Value Ref Range    Cholesterol, total 182 100 - 199 mg/dL    Triglyceride 118 0 - 149 mg/dL    HDL Cholesterol 45 >39 mg/dL    VLDL, calculated 24 5 - 40 mg/dL    LDL, calculated 113 (H) 0 - 99 mg/dL    Narrative    Performed at:  [address]  [id number]  : Jag Major MD, Phone:  [phone]   METABOLIC PANEL, COMPREHENSIVE   Result Value Ref Range    Glucose 133 (H) 65 - 99 mg/dL    BUN 14 6 - 24 mg/dL    Creatinine 0.59 0.57 - 1.00 mg/dL    GFR est non- >59 mL/min/1.73    GFR est  >59 mL/min/1.73    BUN/Creatinine ratio 24 (H) 9 - 23    Sodium 141 134 - 144 mmol/L    Potassium 4.7 3.5 - 5.2 mmol/L    Chloride 98 96 - 106 mmol/L    CO2 26 18 - 29 mmol/L    Calcium 9.8 8.7 - 10.2 mg/dL    Protein, total 6.5 6.0 - 8.5 g/dL    Albumin 4.2 3.5 - 5.5 g/dL    GLOBULIN, TOTAL 2.3 1.5 - 4.5 g/dL    A-G Ratio 1.8 1.2 - 2.2    Bilirubin, total 0.3 0.0 - 1.2 mg/dL    Alk. phosphatase 76 39 - 117 IU/L    AST (SGOT) 12 0 - 40 IU/L    ALT (SGPT) 15 0 - 32 IU/L    Narrative    Performed at:  [address]  [id number]  : Benji Franklin MD, Phone:  [phone]   HEPATITIS C AB   Result Value Ref Range    Hep C Virus Ab <0.1 0.0 - 0.9 s/co ratio    Narrative    Performed at:  [address]  [id number]  : Benji Franklin MD, Phone:  [phone]   TSH 3RD GENERATION   Result Value Ref Range    TSH 0.728 0.450 - 4.500 uIU/mL    Narrative    Performed at:  [address]  [id number]  : Benji Franklin MD, Phone:  [phone]       Assessment/ Plan:     Diagnoses and all orders for this visit:    1. Essential hypertension  -     CBC WITH AUTOMATED DIFF  -     LIPID PANEL  -     METABOLIC PANEL, COMPREHENSIVE  -     HEPATITIS C AB  -     COLLECTION VENOUS BLOOD,VENIPUNCTURE  -     TSH 3RD GENERATION    2. Encounter for immunization  -     INFLUENZA VACCINE QUADRIVALENT VIAL, SPLIT, 3 YRS PLUS IM  -     GA IMMUNIZ ADMIN,1 SINGLE/COMB VAC/TOXOID    3. BMI 36.0-36.9,adult  -     CBC WITH AUTOMATED DIFF  -     LIPID PANEL  -     METABOLIC PANEL, COMPREHENSIVE  -     HEPATITIS C AB  -     COLLECTION VENOUS BLOOD,VENIPUNCTURE  -     TSH 3RD GENERATION    4. Thyroid disease  -     TSH 3RD GENERATION    5. Screening for breast cancer  -     [facility] 3D COLLEEN W MAMMO BI SCREENING INCL CAD;  Future    Other orders  -     lisinopril-hydroCHLOROthiazide (PRINZIDE, ZESTORETIC) 20-12.5 mg per tablet; TAKE TWO TABLETS BY MOUTH EVERY DAY  -     amLODIPine (NORVASC) 5 mg tablet; TAKE ONE TABLET BY MOUTH EVERY DAY  -     escitalopram oxalate (LEXAPRO) 20 mg tablet; TAKE ONE TABLET BY MOUTH EVERY DAY  -     buPROPion (WELLBUTRIN) 75 mg tablet; Take 1 Tab by mouth two (2) times a day. -     atorvastatin (LIPITOR) 20 mg tablet; Take 1 Tab by mouth daily. 1. Essential hypertension    2. Encounter for immunization    3. BMI 36.0-36.9,adult    4. Thyroid disease    5. Screening for breast cancer        Orders Placed This Encounter    COLLECTION VENOUS [facility] 3D COLLEEN W MAMMO BI SCREENING INCL CAD     Standing Status:   Future     Standing Expiration Date:   12/30/2018     Scheduling Instructions:      [facility]     Order Specific Question:   Reason for Exam     Answer:   screening breast cancer    INFLUENZA VACCINE QUADRIVALENT VIAL, SPLIT, 3 YRS PLUS IM    CBC WITH AUTOMATED DIFF    LIPID PANEL    METABOLIC PANEL, COMPREHENSIVE    HEPATITIS C AB    TSH 3RD GENERATION    OK IMMUNIZ ADMIN,1 SINGLE/COMB VAC/TOXOID    lisinopril-hydroCHLOROthiazide (PRINZIDE, ZESTORETIC) 20-12.5 mg per tablet     Sig: TAKE TWO TABLETS BY MOUTH EVERY DAY     Dispense:  180 Tab     Refill:  1    amLODIPine (NORVASC) 5 mg tablet     Sig: TAKE ONE TABLET BY MOUTH EVERY DAY     Dispense:  90 Tab     Refill:  1    escitalopram oxalate (LEXAPRO) 20 mg tablet     Sig: TAKE ONE TABLET BY MOUTH EVERY DAY     Dispense:  90 Tab     Refill:  1    buPROPion (WELLBUTRIN) 75 mg tablet     Sig: Take 1 Tab by mouth two (2) times a day. Dispense:  180 Tab     Refill:  1    atorvastatin (LIPITOR) 20 mg tablet     Sig: Take 1 Tab by mouth daily. Dispense:  90 Tab     Refill:  1         Verbal and written instructions (see AVS) provided.  Patient expresses understanding of diagnosis and treatment plan. Follow-up Disposition:  Return in about 6 months (around 5/27/2018).       JERRELL Owusu

## 2018-06-11 RX ORDER — ATORVASTATIN CALCIUM 20 MG/1
TABLET, FILM COATED ORAL
Qty: 90 TAB | Refills: 0 | Status: SHIPPED | OUTPATIENT
Start: 2018-06-11 | End: 2018-11-30 | Stop reason: SDUPTHER

## 2018-06-11 RX ORDER — AMLODIPINE BESYLATE 5 MG/1
TABLET ORAL
Qty: 90 TAB | Refills: 0 | Status: SHIPPED | OUTPATIENT
Start: 2018-06-11 | End: 2018-11-30 | Stop reason: SDUPTHER

## 2018-06-26 RX ORDER — LISINOPRIL AND HYDROCHLOROTHIAZIDE 12.5; 2 MG/1; MG/1
TABLET ORAL
Qty: 180 TAB | Refills: 0 | Status: SHIPPED | OUTPATIENT
Start: 2018-06-26 | End: 2018-06-27 | Stop reason: SDUPTHER

## 2018-06-28 ENCOUNTER — OFFICE VISIT (OUTPATIENT)
Dept: FAMILY MEDICINE CLINIC | Age: 58
End: 2018-06-28

## 2018-06-28 VITALS
TEMPERATURE: 96.8 F | HEART RATE: 88 BPM | HEIGHT: 66 IN | DIASTOLIC BLOOD PRESSURE: 70 MMHG | BODY MASS INDEX: 36.48 KG/M2 | OXYGEN SATURATION: 96 % | WEIGHT: 227 LBS | SYSTOLIC BLOOD PRESSURE: 112 MMHG

## 2018-06-28 DIAGNOSIS — Z12.11 COLON CANCER SCREENING: ICD-10-CM

## 2018-06-28 DIAGNOSIS — F41.9 ANXIETY: ICD-10-CM

## 2018-06-28 DIAGNOSIS — I10 ESSENTIAL HYPERTENSION: Primary | ICD-10-CM

## 2018-06-28 DIAGNOSIS — E07.9 THYROID DISEASE: ICD-10-CM

## 2018-06-28 DIAGNOSIS — F32.0 CURRENT MILD EPISODE OF MAJOR DEPRESSIVE DISORDER WITHOUT PRIOR EPISODE (HCC): ICD-10-CM

## 2018-06-28 PROBLEM — E66.01 SEVERE OBESITY (BMI 35.0-39.9): Status: ACTIVE | Noted: 2018-06-28

## 2018-06-28 RX ORDER — ESCITALOPRAM OXALATE 20 MG/1
TABLET ORAL
Qty: 90 TAB | Refills: 1
Start: 2018-06-28 | End: 2018-07-02 | Stop reason: SDUPTHER

## 2018-06-28 RX ORDER — AMOXICILLIN 500 MG/1
CAPSULE ORAL 3 TIMES DAILY
COMMUNITY
Start: 2018-06-26 | End: 2018-11-30

## 2018-06-28 RX ORDER — LISINOPRIL AND HYDROCHLOROTHIAZIDE 12.5; 2 MG/1; MG/1
TABLET ORAL
Qty: 180 TAB | Refills: 0 | Status: SHIPPED | OUTPATIENT
Start: 2018-06-28 | End: 2018-11-30 | Stop reason: SDUPTHER

## 2018-06-28 NOTE — PATIENT INSTRUCTIONS
Body Mass Index: Care Instructions  Your Care Instructions    Body mass index (BMI) can help you see if your weight is raising your risk for health problems. It uses a formula to compare how much you weigh with how tall you are. · A BMI lower than 18.5 is considered underweight. · A BMI between 18.5 and 24.9 is considered healthy. · A BMI between 25 and 29.9 is considered overweight. A BMI of 30 or higher is considered obese. If your BMI is in the normal range, it means that you have a lower risk for weight-related health problems. If your BMI is in the overweight or obese range, you may be at increased risk for weight-related health problems, such as high blood pressure, heart disease, stroke, arthritis or joint pain, and diabetes. If your BMI is in the underweight range, you may be at increased risk for health problems such as fatigue, lower protection (immunity) against illness, muscle loss, bone loss, hair loss, and hormone problems. BMI is just one measure of your risk for weight-related health problems. You may be at higher risk for health problems if you are not active, you eat an unhealthy diet, or you drink too much alcohol or use tobacco products. Follow-up care is a key part of your treatment and safety. Be sure to make and go to all appointments, and call your doctor if you are having problems. It's also a good idea to know your test results and keep a list of the medicines you take. How can you care for yourself at home? · Practice healthy eating habits. This includes eating plenty of fruits, vegetables, whole grains, lean protein, and low-fat dairy. · If your doctor recommends it, get more exercise. Walking is a good choice. Bit by bit, increase the amount you walk every day. Try for at least 30 minutes on most days of the week. · Do not smoke. Smoking can increase your risk for health problems. If you need help quitting, talk to your doctor about stop-smoking programs and medicines. These can increase your chances of quitting for good. · Limit alcohol to 2 drinks a day for men and 1 drink a day for women. Too much alcohol can cause health problems. If you have a BMI higher than 25  · Your doctor may do other tests to check your risk for weight-related health problems. This may include measuring the distance around your waist. A waist measurement of more than 40 inches in men or 35 inches in women can increase the risk of weight-related health problems. · Talk with your doctor about steps you can take to stay healthy or improve your health. You may need to make lifestyle changes to lose weight and stay healthy, such as changing your diet and getting regular exercise. If you have a BMI lower than 18.5  · Your doctor may do other tests to check your risk for health problems. · Talk with your doctor about steps you can take to stay healthy or improve your health. You may need to make lifestyle changes to gain or maintain weight and stay healthy, such as getting more healthy foods in your diet and doing exercises to build muscle. Where can you learn more? Go to http://frank-dennis.info/. Enter S176 in the search box to learn more about \"Body Mass Index: Care Instructions. \"  Current as of: October 13, 2016  Content Version: 11.4  © 2171-5683 Healthwise, [employer]. Care instructions adapted under license by [employer] (which disclaims liability or warranty for this information). If you have questions about a medical condition or this instruction, always ask your healthcare professional. [place] [address] any warranty or liability for your use of this information. Heart-Healthy Diet: Care Instructions  Your Care Instructions    A heart-healthy diet has lots of vegetables, fruits, nuts, beans, and whole grains, and is low in salt. It limits foods that are high in saturated fat, such as meats, cheeses, and fried foods.  It may be hard to change your diet, but even small changes can lower your risk of heart attack and heart disease. Follow-up care is a key part of your treatment and safety. Be sure to make and go to all appointments, and call your doctor if you are having problems. It's also a good idea to know your test results and keep a list of the medicines you take. How can you care for yourself at home? Watch your portions  · Learn what a serving is. A \"serving\" and a \"portion\" are not always the same thing. Make sure that you are not eating larger portions than are recommended. For example, a serving of pasta is ½ cup. A serving size of meat is 2 to 3 ounces. A 3-ounce serving is about the size of a deck of cards. Measure serving sizes until you are good at [place]" them. Keep in mind that restaurants often serve portions that are 2 or 3 times the size of one serving. · To keep your energy level up and keep you from feeling hungry, eat often but in smaller portions. · Eat only the number of calories you need to stay at a healthy weight. If you need to lose weight, eat fewer calories than your body burns (through exercise and other physical activity). Eat more fruits and vegetables  · Eat a variety of fruit and vegetables every day. Dark green, deep orange, red, or yellow fruits and vegetables are especially good for you. Examples include spinach, carrots, peaches, and berries. · Keep carrots, celery, and other veggies handy for snacks. Buy fruit that is in season and store it where you can see it so that you will be tempted to eat it. · Cook dishes that have a lot of veggies in them, such as stir-fries and soups. Limit saturated and trans fat  · Read food labels, and try to avoid saturated and trans fats. They increase your risk of heart disease. Trans fat is found in many processed foods such as cookies and crackers. · Use olive or canola oil when you cook.  Try cholesterol-lowering spreads, such as Benecol or Take Control. · Bake, broil, grill, or steam foods instead of frying them. · Choose lean meats instead of high-fat meats such as hot dogs and sausages. Cut off all visible fat when you prepare meat. · Eat fish, skinless poultry, and meat alternatives such as soy products instead of high-fat meats. Soy products, such as tofu, may be especially good for your heart. · Choose low-fat or fat-free milk and dairy products. Eat fish  · Eat at least two servings of fish a week. Certain fish, such as salmon and tuna, contain omega-3 fatty acids, which may help reduce your risk of heart attack. Eat foods high in fiber  · Eat a variety of grain products every day. Include whole-grain foods that have lots of fiber and nutrients. Examples of whole-grain foods include oats, whole wheat bread, and brown rice. · Buy whole-grain breads and cereals, instead of white bread or pastries. Limit salt and sodium  · Limit how much salt and sodium you eat to help lower your blood pressure. · Taste food before you salt it. Add only a little salt when you think you need it. With time, your taste buds will adjust to less salt. · Eat fewer snack items, fast foods, and other high-salt, processed foods. Check food labels for the amount of sodium in packaged foods. · Choose low-sodium versions of canned goods (such as soups, vegetables, and beans). Limit sugar  · Limit drinks and foods with added sugar. These include candy, desserts, and soda pop. Limit alcohol  · Limit alcohol to no more than 2 drinks a day for men and 1 drink a day for women. Too much alcohol can cause health problems. When should you call for help? Watch closely for changes in your health, and be sure to contact your doctor if:  ? · You would like help planning heart-healthy meals. Where can you learn more? Go to http://frank-dennis.info/. Enter V137 in the search box to learn more about \"Heart-Healthy Diet: Care Instructions. \"  Current as of: September 21, 2016  Content Version: 11.4  © 3094-0211 Healthwise, Inc[employer]. Care instructions adapted under license by [employer] (which disclaims liability or warranty for this information). If you have questions about a medical condition or this instruction, always ask your healthcare professional. [place] [address] any warranty or liability for your use of this information.

## 2018-06-28 NOTE — PROGRESS NOTES
Subjective:     Anju Silver is a 62 y.o. female who presents today with the following:  Chief Complaint   Patient presents with    Hypertension     checkup    Anxiety     panic attack       Patient Active Problem List   Diagnosis Code    Depression F32.9    Hypertension I10    Joint pain M25.50    Anxiety F41.9    Thyroid disease E07.9    Severe obesity (BMI 35.0-39.9) ([facility]) E66.01    Osteoarthritis of right knee M17.11    Right knee pain M25.561         COMPLIANT WITH MEDICATION:   HTN; Denies chest pain, dyspnea, palpitations, headache and blurred vision. Blood pressure normotensive. BMI: discussed BMI with her    Colon Cancer Screening: hx of polyps pt reports. Last colonoscopy 10 years ago. Thyroid disease: hx of polyps with negative biopsy several years ago. No problems. Depression/Anxiety: getting rady to have major dental work ( new bridges etc after [place] vacation) increased feelings of depression and some panic attacks. Was on Lexapro 40 mg in the past with good results. Had stopped Lexapro for several years. Restarted 20 mg. Request to increase back to 40 mg before considering another medication for panic attacks. ROS:  Gen: denies fever, chills, fatigue, weight loss, weight gain  HEENT:denies blurry vision, nasal congestion, sore throat  Resp: denies dypsnea, cough, wheezing  CV: denies chest pain radiating to the jaws or arms, palpitations, lower extremity edema  Abd: denies nausea, vomiting, diarrhea, constipation  Neuro: denies numbness/tingling  Endo: denies polyuria, polydipsia, heat/cold intolerance  Heme: no lymphadenopathy    No Known Allergies      Current Outpatient Prescriptions:     amoxicillin (AMOXIL) 500 mg capsule, three (3) times daily. , Disp: , Rfl:     escitalopram oxalate (LEXAPRO) 20 mg tablet, TAKE TWO TABLET BY MOUTH EVERY DAY TOTAL OF 40 MG PER DAY, Disp: 90 Tab, Rfl: 1    lisinopril-hydroCHLOROthiazide (PRINZIDE, ZESTORETIC) 20-12.5 mg per tablet, TAKE TWO TABLETS BY MOUTH EVERY DAY, Disp: 180 Tab, Rfl: 0    atorvastatin (LIPITOR) 20 mg tablet, TAKE ONE TABLET BY MOUTH EVERY DAY, Disp: 90 Tab, Rfl: 0    amLODIPine (NORVASC) 5 mg tablet, TAKE ONE TABLET BY MOUTH EVERY DAY, Disp: 90 Tab, Rfl: 0    buPROPion (WELLBUTRIN) 75 mg tablet, Take 1 Tab by mouth two (2) times a day., Disp: 180 Tab, Rfl: 1    melatonin 5 mg cap capsule, Take 1 Cap by mouth nightly.  Indications: INSOMNIA, Disp: 30 Cap, Rfl: 3    Past Medical History:   Diagnosis Date    Anxiety     Biceps tendon tear     Depression     Frequent headaches     Hypertension     Joint pain     Kidney stones     Menopause     MRSA infection     knee    Palpitations     Thyroid disease     thyroid problem nodules on thyroid       Past Surgical History:   Procedure Laterality Date    HX  SECTION      3 of them    HX COLONOSCOPY  2004    Pt report, polyps BEKKESTUA    HX ORTHOPAEDIC      torn knee left and right knee ,carpal tunnel    HX ORTHOPAEDIC      CTS, lt shoulder       History   Smoking Status    Current Every Day Smoker    Packs/day: 1.00    Types: Cigarettes   Smokeless Tobacco    Never Used       Social History     Social History    Marital status:      Spouse name: N/A    Number of children: N/A    Years of education: N/A     Social History Main Topics    Smoking status: Current Every Day Smoker     Packs/day: 1.00     Types: Cigarettes    Smokeless tobacco: Never Used    Alcohol use 0.0 oz/week     0 Standard drinks or equivalent per week      Comment: 5-6 drinks a week    Drug use: No    Sexual activity: Yes     Other Topics Concern    None     Social History Narrative       Family History   Problem Relation Age of Onset    Cancer Other      luekemia    Cancer Mother     Hypertension Mother     Diabetes Father     Parkinson's Disease Father     Hypertension Brother     Hypertension Maternal Grandmother     Stroke Maternal Grandmother     Heart Disease Paternal Grandmother          Objective:     Visit Vitals    /70 (BP 1 Location: Left arm, BP Patient Position: Sitting)    Pulse 88    Temp 96.8 °F (36 °C) (Temporal)    Ht 5' 5.5\" (1.664 m)    Wt 227 lb (103 kg)    SpO2 96%    BMI 37.2 kg/m2     Body mass index is 37.2 kg/(m^2). General: Alert and oriented. No acute distress. Well nourished, obese. HEENT :  Ears:TMs are normal. Canals are clear. Eyes: pupils equal, round, react to light and accommodation. Extra ocular movements intact. Nose: patent. Mouth and throat is clear. Neck:supple full range of motion positive  thyromegaly. Trachea midline, No carotid bruits. No significant lymphadenopathy  Lungs[de-identified] clear to auscultation without wheezes, rales, or rhonchi. Heart :RRR, S1 & S2 are normal intensity. No murmur; no gallop  Abdomen: bowel sounds active. No tenderness, guarding, rebound,hard to examine due to habitus  masses, hepatic or spleen enlargement  Back: no CVA tenderness. Extremities: without clubbing, cyanosis, or edema  Pulses: radial and femoral pulses are normal  Neuro: HMF intact. Cranial nerves II through XII grossly normal.  Motor: is 5 over 5 and symmetrical.   Deep tendon reflexes: +2 equal        No results found for this visit on 06/28/18. Assessment/ Plan:     1. BMI 37.0-37.9, adult  Discussed the patient's BMI with her. The BMI follow up plan is as follows:     dietary management education, guidance, and counseling  encourage exercise  monitor weight  prescribed dietary intake    An After Visit Summary was printed and given to the patient. 2. Colon cancer screening  Ref colonoscopy    3. Essential hypertension  BP in goal continue current medical regime. 4. Thyroid disease  Hx of thyroid polyps with negative biopsy  Stable      5.  Current mild episode of major depressive disorder without prior episode [facility])    Depression/Anxiety Increase symptoms of depression and anxiety related to upcoming dental procedure   Increase Lexapro to 40 mg per day. Orders Placed This Encounter    OCCULT BLOOD, IMMUNOASSAY (FIT)     Standing Status:   Future     Standing Expiration Date:   12/28/2018    amoxicillin (AMOXIL) 500 mg capsule     Sig: three (3) times daily.  escitalopram oxalate (LEXAPRO) 20 mg tablet     Sig: TAKE TWO TABLET BY MOUTH EVERY DAY TOTAL OF 40 MG PER DAY     Dispense:  90 Tab     Refill:  1         Verbal and written instructions (see AVS) provided.  Patient expresses understanding of diagnosis and treatment plan. Health Maintenance Due   Topic Date Due    FOBT Q 1 YEAR AGE 50-75  04/21/2018         Follow-up Disposition:  Return in about 1 month (around 7/28/2018). For medication review.    JERRELL Holloway

## 2018-06-28 NOTE — PROGRESS NOTES
1. Have you been to the ER, urgent care clinic since your last visit? Hospitalized since your last visit? No    2. Have you seen or consulted any other health care providers outside of the [address] [place] [address] since your last visit? Include any pap smears or colon screening.  YES, Dentist.

## 2018-06-28 NOTE — ACP (ADVANCE CARE PLANNING)
Discussed importance of advanced care planning. Patient has advanced medical directive at home and will bring it to be reviewed and scanned into chart.    Asha CORBINC

## 2018-06-28 NOTE — MR AVS SNAPSHOT
[address] [place] [address] [address] 
[phone] Patient: All Cooper MRN: [id number] :1960 Visit Information Date & Time Provider Department Dept. Phone Encounter #  
 2018  9:00 AM Marquise Dominguez NP [facility] [address] [facility] [phone] [id number] Follow-up Instructions Return in about 1 month (around 2018). Upcoming Health Maintenance Date Due FOBT Q 1 YEAR AGE 50-75 2018 Influenza Age 5 to Adult 2018 BREAST CANCER SCRN MAMMOGRAM 2020 PAP AKA CERVICAL CYTOLOGY 2020 DTaP/Tdap/Td series (2 - Td) 2/10/2027 Allergies as of 2018  Review Complete On: 2018 By: Scott Zhu RN No Known Allergies Current Immunizations  Never Reviewed Name Date Influenza Vaccine Gerfrantz Cespedes) 2017 Pneumococcal Conjugate (PCV-13) 2/10/2017 Tdap 2/10/2017 Not reviewed this visit You Were Diagnosed With   
  
 Codes Comments Essential hypertension    -  Primary ICD-10-CM: I10 
ICD-9-CM: 401.9 BMI 37.0-37.9, adult     ICD-10-CM: Z68.37 ICD-9-CM: V85.37 Colon cancer screening     ICD-10-CM: Z12.11 ICD-9-CM: V76.51 Thyroid disease     ICD-10-CM: E07.9 ICD-9-CM: 246.9 Current mild episode of major depressive disorder without [facility]     ICD-10-CM: F32.0 ICD-9-CM: 296.21 Vitals BP Pulse Temp Height(growth percentile) Weight(growth percentile) SpO2  
 112/70 (BP 1 Location: Left arm, BP Patient Position: Sitting) 88 96.8 °F (36 °C) (Temporal) 5' 5.5\" (1.664 m) 227 lb (103 kg) 96% BMI OB Status Smoking Status 37.2 kg/m2 Menopause Current Every Day Smoker BMI and BSA Data Body Mass Index Body Surface Area  
 37.2 kg/m 2 2.18 m 2 Preferred Pharmacy Pharmacy Name Phone [address] [phone] Your Updated Medication List  
  
   
This list is accurate as of 6/28/18  9:36 AM.  Always use your most recent med list. amLODIPine 5 mg tablet Commonly known as:  Rios Rubaclava TAKE ONE TABLET BY MOUTH EVERY DAY  
  
 amoxicillin 500 mg capsule Commonly known as:  AMOXIL  
three (3) times daily. atorvastatin 20 mg tablet Commonly known as:  LIPITOR  
TAKE ONE TABLET BY MOUTH EVERY DAY  
  
 buPROPion 75 mg tablet Commonly known as:  [facility] Take 1 Tab by mouth two (2) times a day. escitalopram oxalate 20 mg tablet Commonly known as:  Vallorie Primmer TAKE TWO TABLET BY MOUTH EVERY DAY TOTAL OF 40 MG PER DAY  
  
 lisinopril-hydroCHLOROthiazide 20-12.5 mg per tablet Commonly known as:  PRINZIDE, ZESTORETIC  
TAKE TWO TABLETS BY MOUTH EVERY DAY  
  
 melatonin 5 mg Cap capsule Take 1 Cap by mouth nightly. Indications: INSOMNIA Follow-up Instructions Return in about 1 month (around 7/28/2018). To-Do List   
 07/28/2018 Lab:  OCCULT BLOOD, IMMUNOASSAY (FIT) Patient Instructions Body Mass Index: Care Instructions Your Care Instructions Body mass index (BMI) can help you see if your weight is raising your risk for health problems. It uses a formula to compare how much you weigh with how tall you are. · A BMI lower than 18.5 is considered underweight. · A BMI between 18.5 and 24.9 is considered healthy. · A BMI between 25 and 29.9 is considered overweight. A BMI of 30 or higher is considered obese. If your BMI is in the normal range, it means that you have a lower risk for weight-related health problems. If your BMI is in the overweight or obese range, you may be at increased risk for weight-related health problems, such as high blood pressure, heart disease, stroke, arthritis or joint pain, and diabetes.  If your BMI is in the underweight range, you may be at increased risk for health problems such as fatigue, lower protection (immunity) against illness, muscle loss, bone loss, hair loss, and hormone problems. BMI is just one measure of your risk for weight-related health problems. You may be at higher risk for health problems if you are not active, you eat an unhealthy diet, or you drink too much alcohol or use tobacco products. Follow-up care is a key part of your treatment and safety. Be sure to make and go to all appointments, and call your doctor if you are having problems. It's also a good idea to know your test results and keep a list of the medicines you take. How can you care for yourself at home? · Practice healthy eating habits. This includes eating plenty of fruits, vegetables, whole grains, lean protein, and low-fat dairy. · If your doctor recommends it, get more exercise. Walking is a good choice. Bit by bit, increase the amount you walk every day. Try for at least 30 minutes on most days of the week. · Do not smoke. Smoking can increase your risk for health problems. If you need help quitting, talk to your doctor about stop-smoking programs and medicines. These can increase your chances of quitting for good. · Limit alcohol to 2 drinks a day for men and 1 drink a day for women. Too much alcohol can cause health problems. If you have a BMI higher than 25 · Your doctor may do other tests to check your risk for weight-related health problems. This may include measuring the distance around your waist. A waist measurement of more than 40 inches in men or 35 inches in women can increase the risk of weight-related health problems. · Talk with your doctor about steps you can take to stay healthy or improve your health. You may need to make lifestyle changes to lose weight and stay healthy, such as changing your diet and getting regular exercise. If you have a BMI lower than 18.5 · Your doctor may do other tests to check your risk for health problems.  
· Talk with your doctor about steps you can take to stay healthy or improve your health. You may need to make lifestyle changes to gain or maintain weight and stay healthy, such as getting more healthy foods in your diet and doing exercises to build muscle. Where can you learn more? Go to http://frank-denins.info/. Enter S176 in the search box to learn more about \"Body Mass Index: Care Instructions. \" Current as of: October 13, 2016 Content Version: 11.4 © 1666-1369 "[employer]. Care instructions adapted under license by [employer] (which disclaims liability or warranty for this information). If you have questions about a medical condition or this instruction, always ask your healthcare professional. [address] any warranty or liability for your use of this information. Heart-Healthy Diet: Care Instructions Your Care Instructions A heart-healthy diet has lots of vegetables, fruits, nuts, beans, and whole grains, and is low in salt. It limits foods that are high in saturated fat, such as meats, cheeses, and fried foods. It may be hard to change your diet, but even small changes can lower your risk of heart attack and heart disease. Follow-up care is a key part of your treatment and safety. Be sure to make and go to all appointments, and call your doctor if you are having problems. It's also a good idea to know your test results and keep a list of the medicines you take. How can you care for yourself at home? Watch your portions · Learn what a serving is. A \"serving\" and a \"portion\" are not always the same thing. Make sure that you are not eating larger portions than are recommended. For example, a serving of pasta is ½ cup. A serving size of meat is 2 to 3 ounces. A 3-ounce serving is about the size of a deck of cards. Measure serving sizes until you are good at [place]" them. Keep in mind that restaurants often serve portions that are 2 or 3 times the size of one serving. · To keep your energy level up and keep you from feeling hungry, eat often but in smaller portions. · Eat only the number of calories you need to stay at a healthy weight. If you need to lose weight, eat fewer calories than your body burns (through exercise and other physical activity). Eat more fruits and vegetables · Eat a variety of fruit and vegetables every day. Dark green, deep orange, red, or yellow fruits and vegetables are especially good for you. Examples include spinach, carrots, peaches, and berries. · Keep carrots, celery, and other veggies handy for snacks. Buy fruit that is in season and store it where you can see it so that you will be tempted to eat it. · Cook dishes that have a lot of veggies in them, such as stir-fries and soups. Limit saturated and trans fat · Read food labels, and try to avoid saturated and trans fats. They increase your risk of heart disease. Trans fat is found in many processed foods such as cookies and crackers. · Use olive or canola oil when you cook. Try cholesterol-lowering spreads, such as Benecol or Take Control. · Bake, broil, grill, or steam foods instead of frying them. · Choose lean meats instead of high-fat meats such as hot dogs and sausages. Cut off all visible fat when you prepare meat. · Eat fish, skinless poultry, and meat alternatives such as soy products instead of high-fat meats. Soy products, such as tofu, may be especially good for your heart. · Choose low-fat or fat-free milk and dairy products. Eat fish · Eat at least two servings of fish a week. Certain fish, such as salmon and tuna, contain omega-3 fatty acids, which may help reduce your risk of heart attack. Eat foods high in fiber · Eat a variety of grain products every day. Include whole-grain foods that have lots of fiber and nutrients. Examples of whole-grain foods include oats, whole wheat bread, and brown rice. · Buy whole-grain breads and cereals, instead of white bread or pastries. Limit salt and sodium · Limit how much salt and sodium you eat to help lower your blood pressure. · Taste food before you salt it. Add only a little salt when you think you need it. With time, your taste buds will adjust to less salt. · Eat fewer snack items, fast foods, and other high-salt, processed foods. Check food labels for the amount of sodium in packaged foods. · Choose low-sodium versions of canned goods (such as soups, vegetables, and beans). Limit sugar · Limit drinks and foods with added sugar. These include candy, desserts, and soda pop. Limit alcohol · Limit alcohol to no more than 2 drinks a day for men and 1 drink a day for women. Too much alcohol can cause health problems. When should you call for help? Watch closely for changes in your health, and be sure to contact your doctor if: 
? · You would like help planning heart-healthy meals. Where can you learn more? Go to http://frank-dennis.info/. Enter V137 in the search box to learn more about \"Heart-Healthy Diet: Care Instructions. \" Current as of: September 21, 2016 Content Version: 11.4 © 8016-1671 [employer]. Care instructions adapted under license by [employer] (which disclaims liability or warranty for this information). If you have questions about a medical condition or this instruction, always ask your healthcare professional. [place] [address] any warranty or liability for your use of this information. Introducing [facility]! [employer] introduces [employer] patient portal. Now you can access parts of your medical record, email your doctor's office, and request medication refills online. 1. In your internet browser, go to [email or web address] 2. Click on the First Time User? Click Here link in the Sign In box. You will see the New Member Sign Up page. 3. Enter your [employer] Access Code exactly as it appears below. You will not need to use this code after youve completed the sign-up process. If you do not sign up before the expiration date, you must request a new code. · [employer] Access Code: [email or web address] Expires: 9/26/2018  9:36 AM 
 
4. Enter the last four digits of your Social Security Number (xxxx) and Date of Birth (mm/dd/yyyy) as indicated and click Submit. You will be taken to the next sign-up page. 5. Create a [employer] ID. This will be your [employer] login ID and cannot be changed, so think of one that is secure and easy to remember. 6. Create a [employer] password. You can change your password at any time. 7. Enter your Password Reset Question and Answer. This can be used at a later time if you forget your password. 8. Enter your e-mail address. You will receive e-mail notification when new information is available in [address] 9. Click Sign Up. You can now view and download portions of your medical record. 10. Click the Download Summary menu link to download a portable copy of your medical information. If you have questions, please visit the Frequently Asked Questions section of the [employer] website. Remember, [employer] is NOT to be used for urgent needs. For medical emergencies, dial 911. Now available from your iPhone and Android! Please provide this summary of care documentation to your next provider. Your primary care clinician is listed as Mariel Ramirez. If you have any questions after today's visit, please call [phone].

## 2018-07-02 RX ORDER — ESCITALOPRAM OXALATE 20 MG/1
TABLET ORAL
Qty: 90 TAB | Refills: 1 | Status: SHIPPED | OUTPATIENT
Start: 2018-07-02 | End: 2018-10-16 | Stop reason: SDUPTHER

## 2018-07-03 NOTE — TELEPHONE ENCOUNTER
Ms Iker Carty has a history of getting her Lexapro 20 mg daily. The Rx from 7/2/2018 was stating 2 / day 40 mg with # 90 which would be an odd # for 2 /day. She was calling to clarify the Rx. I stated to her that Catrachito Serrato is out for the day, but will try to research it and give her a call back.

## 2018-07-03 NOTE — TELEPHONE ENCOUNTER
Zahida Gallegos LPN and she stated that Dontae Bright could do # 60 with the 1 refill and we can clear it with Pradeep Ramires when she returns.

## 2018-07-03 NOTE — TELEPHONE ENCOUNTER
Spoke back with Dontae Bright to inform her and she stated that she could leave the # 90 as is with the 1 refill until Pradeep Ramires clarifies it. Her main concern was that it is OK for the 40 mg per day. I stated to her that according to her last OV 06/28/2018 with Pradeep Ramires, it is stating the increase. She stated OK. Message routed to Pradeep Ramires for clarification when she returns.

## 2018-07-04 NOTE — TELEPHONE ENCOUNTER
Lexapro dosage increased to 40 per day. 2 tablets daily. The medication does not come in a 40 mg tablet.

## 2018-07-05 NOTE — TELEPHONE ENCOUNTER
The  Lexapro 40 mg Rx was confirmed with [facility] [employer].

## 2018-07-31 ENCOUNTER — DOCUMENTATION ONLY (OUTPATIENT)
Dept: SURGERY | Age: 58
End: 2018-07-31

## 2018-08-21 ENCOUNTER — OFFICE VISIT (OUTPATIENT)
Dept: SURGERY | Age: 58
End: 2018-08-21

## 2018-08-21 VITALS
WEIGHT: 223.2 LBS | SYSTOLIC BLOOD PRESSURE: 103 MMHG | DIASTOLIC BLOOD PRESSURE: 65 MMHG | HEIGHT: 66 IN | HEART RATE: 69 BPM | BODY MASS INDEX: 35.87 KG/M2

## 2018-08-21 DIAGNOSIS — Z86.010 HX OF COLONIC POLYP: Primary | ICD-10-CM

## 2018-08-22 RX ORDER — POLYETHYLENE GLYCOL 3350, SODIUM CHLORIDE, SODIUM BICARBONATE, POTASSIUM CHLORIDE 420; 11.2; 5.72; 1.48 G/4L; G/4L; G/4L; G/4L
POWDER, FOR SOLUTION ORAL
Qty: 1 BOTTLE | Refills: 0 | Status: SHIPPED | OUTPATIENT
Start: 2018-08-22 | End: 2018-09-26

## 2018-08-22 RX ORDER — SODIUM CHLORIDE, SODIUM LACTATE, POTASSIUM CHLORIDE, CALCIUM CHLORIDE 600; 310; 30; 20 MG/100ML; MG/100ML; MG/100ML; MG/100ML
200 INJECTION, SOLUTION INTRAVENOUS CONTINUOUS
Status: CANCELLED | OUTPATIENT
Start: 2018-08-22 | End: 2018-08-23

## 2018-08-22 RX ORDER — BISACODYL 5 MG
TABLET, DELAYED RELEASE (ENTERIC COATED) ORAL
Qty: 1 TAB | Refills: 0 | Status: SHIPPED | OUTPATIENT
Start: 2018-08-22 | End: 2018-09-26

## 2018-08-23 NOTE — PROGRESS NOTES
[address] [facility], [facility]  Phone: [phone] Fax: [phone]                                              HISTORY AND PHYSICAL EXAM    NAME: Tamar Sanders  : 1960    Chief Complaint:   History of  Colon polyps    History: Tamar Sanders is a 62 y.o. WHITE OR  female referred for colonoscopy. This is  the patient's second colonoscopy. The previous one showed polyps. The last colonoscopy was in . The bowel movements are regular and brown. Occasionally she gets diarrhea and cramps and takes Lomotil  She does not use any meds on a regular basis for her bowels. She denies any blood in stools or hemorrhoidal disease. Family history is negative for colon cancer or colon polyps.           Past Medical History:   Diagnosis Date    Anxiety     Biceps tendon tear     Depression     Frequent headaches     Hypertension     Joint pain     Kidney stones     Menopause     MRSA infection     knee    Palpitations     Thyroid disease     thyroid problem nodules on thyroid     Past Surgical History:   Procedure Laterality Date    HX  SECTION      3 of them    HX COLONOSCOPY  2004    Pt report, polyps BEKKESTUA    HX ORTHOPAEDIC      torn knee left and right knee ,carpal tunnel    HX ORTHOPAEDIC      CTS, lt shoulder        Current Outpatient Prescriptions   Medication Sig Dispense Refill    peg-electrolyte soln (GAVILYTE-N) 420 gram solution Take as directed  Indications: BOWEL EVACUATION 1 Bottle 0    bisacodyl (DULCOLAX, BISACODYL,) 5 mg EC tablet Take as directed  Indications: BOWEL EVACUATION 1 Tab 0    escitalopram oxalate (LEXAPRO) 20 mg tablet TAKE TWO TABLET BY MOUTH EVERY DAY TOTAL OF 40 MG PER DAY 90 Tab 1    lisinopril-hydroCHLOROthiazide (PRINZIDE, ZESTORETIC) 20-12.5 mg per tablet TAKE TWO TABLETS BY MOUTH EVERY  Tab 0    atorvastatin (LIPITOR) 20 mg tablet TAKE ONE TABLET BY MOUTH EVERY DAY 90 Tab 0    amLODIPine (NORVASC) 5 mg tablet TAKE ONE TABLET BY MOUTH EVERY DAY 90 Tab 0    buPROPion (WELLBUTRIN) 75 mg tablet Take 1 Tab by mouth two (2) times a day. 180 Tab 1    melatonin 5 mg cap capsule Take 1 Cap by mouth nightly. Indications: INSOMNIA 30 Cap 3    amoxicillin (AMOXIL) 500 mg capsule three (3) times daily. No Known Allergies  Social History     Social History    Marital status:      Spouse name: N/A    Number of children: N/A    Years of education: N/A     Occupational History    Not on file. Social History Main Topics    Smoking status: Current Every Day Smoker     Packs/day: 1.00     Types: Cigarettes    Smokeless tobacco: Never Used    Alcohol use 0.0 oz/week     0 Standard drinks or equivalent per week      Comment: 5-6 drinks a week    Drug use: No    Sexual activity: Yes     Other Topics Concern    Not on file     Social History Narrative     Family History   Problem Relation Age of Onset    Cancer Other      luekemia    Cancer Mother      pancreatic    Hypertension Mother     Diabetes Father     Parkinson's Disease Father     Hypertension Brother     Hypertension Maternal Grandmother     Stroke Maternal Grandmother     Heart Disease Paternal Grandmother        Patient Active Problem List   Diagnosis Code    Depression F32.9    Hypertension I10    Joint pain M25.50    Anxiety F41.9    Thyroid disease E07.9    Severe obesity (BMI 35.0-39.9) ([facility]) E66.01    Osteoarthritis of right knee M17.11    Right knee pain M25.561       Review of Systems:  Review of Systems   Constitutional: Negative for chills, fever, malaise/fatigue and weight loss. HENT: Negative for congestion, ear discharge, ear pain, hearing loss, nosebleeds, sore throat and tinnitus. Eyes: Negative for blurred vision, double vision, pain, discharge and redness. Respiratory: Negative for cough, sputum production, shortness of breath and wheezing.     Cardiovascular: Negative for chest pain, palpitations and leg swelling. Gastrointestinal: Negative for abdominal pain, blood in stool, constipation, diarrhea, heartburn, melena, nausea and vomiting. Genitourinary: Negative for frequency, hematuria and urgency. Musculoskeletal: Negative for back pain, joint pain and neck pain. Skin: Negative for itching and rash. Neurological: Negative for dizziness, tremors, focal weakness, seizures, weakness and headaches. Endo/Heme/Allergies: Does not bruise/bleed easily. Psychiatric/Behavioral: Positive for depression. Negative for memory loss. The patient is not nervous/anxious and does not have insomnia. Physical Exam:  Visit Vitals    /65 (BP 1 Location: Left arm, BP Patient Position: Sitting)    Pulse 69    Ht 5' 5.5\" (1.664 m)    Wt 223 lb 3.2 oz (101.2 kg)    BMI 36.58 kg/m2       Physical Exam   Constitutional: She is oriented to person, place, and time. She appears well-developed and well-nourished. HENT:   Head: Normocephalic and atraumatic. Right Ear: External ear normal.   Left Ear: External ear normal.   Nose: Nose normal.   Mouth/Throat: Oropharynx is clear and moist. No oropharyngeal exudate. Eyes: Conjunctivae and EOM are normal. Pupils are equal, round, and reactive to light. Right eye exhibits no discharge. Left eye exhibits no discharge. No scleral icterus. Neck: Neck supple. No JVD present. No tracheal deviation present. No thyromegaly present. Cardiovascular: Normal rate, regular rhythm and normal heart sounds. Exam reveals no gallop and no friction rub. No murmur heard. Pulmonary/Chest: Effort normal and breath sounds normal. She has no wheezes. She has no rales. Abdominal: Soft. Bowel sounds are normal. She exhibits no distension and no mass. There is no tenderness. There is no rebound. Musculoskeletal: Normal range of motion. Lymphadenopathy:     She has no cervical adenopathy.    Neurological: She is alert and oriented to person, place, and time.   Skin: Skin is warm and dry. No rash noted. No erythema. Psychiatric: She has a normal mood and affect. Her behavior is normal. Judgment and thought content normal.       Impression:  History of colon polyps    Plan:  colonoscopy on 3/93/68  Risks and complications were explained to patient and they voiced understanding.     Krish Sandra M.D.

## 2018-08-31 ENCOUNTER — TELEPHONE (OUTPATIENT)
Dept: FAMILY MEDICINE CLINIC | Age: 58
End: 2018-08-31

## 2018-08-31 NOTE — TELEPHONE ENCOUNTER
Patient is taking an oral antibiotic for a dental procedure and has developed severe itching. Please advise.

## 2018-08-31 NOTE — TELEPHONE ENCOUNTER
SW Ms Meg Cannon, she stated that about 20 minutes later this morning after taking her first pill, she started with a really bad itching all over. She did take some Benadryl. She is on Amoxicillin 500 mg TID. Ms Meg Cannon stated that she was on the same kind of medication about one month ago and nothing happened. She was concerned if she should continue with taking it? I stated to her probably not, but will check with Jessica Nichols because I am not sure if she will be changing it or if her dentist who prescribed it should do the change if she has to change it.

## 2018-09-05 RX ORDER — AMLODIPINE BESYLATE 5 MG/1
TABLET ORAL
Qty: 90 TAB | Refills: 0 | Status: SHIPPED | OUTPATIENT
Start: 2018-09-05 | End: 2018-11-30 | Stop reason: SDUPTHER

## 2018-09-05 RX ORDER — ATORVASTATIN CALCIUM 20 MG/1
TABLET, FILM COATED ORAL
Qty: 90 TAB | Refills: 0 | Status: SHIPPED | OUTPATIENT
Start: 2018-09-05 | End: 2018-11-30 | Stop reason: SDUPTHER

## 2018-09-26 PROBLEM — K63.5 COLON POLYP: Status: ACTIVE | Noted: 2018-09-26

## 2018-09-26 PROBLEM — Z86.010 HX OF COLONIC POLYP: Status: ACTIVE | Noted: 2018-09-26

## 2018-09-26 PROBLEM — K57.30 DIVERTICULA OF COLON: Status: ACTIVE | Noted: 2018-09-26

## 2018-09-26 PROBLEM — K63.5 COLON POLYP: Status: RESOLVED | Noted: 2018-09-26 | Resolved: 2018-09-26

## 2018-09-26 LAB — COLONOSCOPY, EXTERNAL: NORMAL

## 2018-10-04 ENCOUNTER — OFFICE VISIT (OUTPATIENT)
Dept: SURGERY | Age: 58
End: 2018-10-04

## 2018-10-04 VITALS
BODY MASS INDEX: 33.43 KG/M2 | HEART RATE: 85 BPM | SYSTOLIC BLOOD PRESSURE: 121 MMHG | WEIGHT: 213 LBS | DIASTOLIC BLOOD PRESSURE: 69 MMHG | HEIGHT: 67 IN

## 2018-10-04 DIAGNOSIS — K63.5 HYPERPLASTIC POLYP OF DESCENDING COLON: Primary | ICD-10-CM

## 2018-10-04 NOTE — PROGRESS NOTES
Follow up from colonoscopy  No c/o      Pathology:  hyperplastic polyp  Irritated diverticulum    Visit Vitals    /69 (BP 1 Location: Left arm, BP Patient Position: Sitting)    Pulse 85    Ht 5' 7\" (1.702 m)    Wt 213 lb (96.6 kg)    BMI 33.36 kg/m2     WDWN patient in no acute distress. Pathophysiology and malignancy potential discussed. Pictures viewed and questions answered. High fiber diet suggested. Time spent 15 minutes in discussion, over half the visit was in care coordination and patient counseling. Repeat scope in 10 years.

## 2018-10-16 RX ORDER — ESCITALOPRAM OXALATE 20 MG/1
TABLET ORAL
Qty: 90 TAB | Refills: 0 | Status: SHIPPED | OUTPATIENT
Start: 2018-10-16 | End: 2018-11-28 | Stop reason: SDUPTHER

## 2018-10-22 ENCOUNTER — TELEPHONE (OUTPATIENT)
Dept: SURGERY | Age: 58
End: 2018-10-22

## 2018-10-22 NOTE — TELEPHONE ENCOUNTER
Pt called c/o bright red blood with bowel movements. Pt stated it started on Friday. Per Dr. Cordelia Barney pt is to see her family doctor or if it continues or get worst go to ER.

## 2018-11-28 RX ORDER — ESCITALOPRAM OXALATE 20 MG/1
TABLET ORAL
Qty: 90 TAB | Refills: 0 | Status: SHIPPED | OUTPATIENT
Start: 2018-11-28 | End: 2019-01-16 | Stop reason: SDUPTHER

## 2018-11-30 PROBLEM — F32.A MILD DEPRESSION: Status: ACTIVE | Noted: 2018-11-30

## 2019-05-30 PROBLEM — Z23 ENCOUNTER FOR IMMUNIZATION: Status: ACTIVE | Noted: 2019-05-30

## 2019-05-30 PROBLEM — R06.02 SHORTNESS OF BREATH: Status: ACTIVE | Noted: 2019-05-30

## 2019-05-30 PROBLEM — Z00.00 ROUTINE ADULT HEALTH MAINTENANCE: Status: ACTIVE | Noted: 2019-05-30

## 2019-07-10 ENCOUNTER — TELEPHONE (OUTPATIENT)
Dept: ONCOLOGY | Age: 59
End: 2019-07-10

## 2019-07-10 NOTE — TELEPHONE ENCOUNTER
Patient was scheduled today for a 2:00 apt. Patient was a n/s, called patient no answer, patient called back & said she was in [facility] had called the [place] office to cancel apt. Patient will call back to r/s.

## 2019-08-02 ENCOUNTER — OFFICE VISIT (OUTPATIENT)
Dept: ONCOLOGY | Age: 59
End: 2019-08-02

## 2019-08-02 VITALS
DIASTOLIC BLOOD PRESSURE: 61 MMHG | RESPIRATION RATE: 20 BRPM | SYSTOLIC BLOOD PRESSURE: 97 MMHG | BODY MASS INDEX: 34.87 KG/M2 | HEIGHT: 66 IN | WEIGHT: 217 LBS | HEART RATE: 76 BPM | OXYGEN SATURATION: 95 % | TEMPERATURE: 98 F

## 2019-08-02 DIAGNOSIS — D72.829 LEUKOCYTOSIS, UNSPECIFIED TYPE: Primary | ICD-10-CM

## 2019-08-02 NOTE — PROGRESS NOTES
Reason for Visit:   Zainab Peraza is a 62 y.o. female who is seen for leukocytosis. Treatment History:   Dx: Leukocytosis--elevated neutrophils and lymphocytes. Earliest dates back to 2016. Most recent values 11K-12k. No anemia or thrombocytopenia. Tx: Rule out CML, CLL    History of Present Illness:   Here for eval of elevated WBC. Has mild elevation for years--dating back to . Higher counts recently. No hx of blood issues. No recent infections or hx of infections. She had issues with MRSA about 6 years ago--had a knee orthroscopy and post procedure MRSA in the knee. Completed 17 weeks of IV antibiotics and has done well since. She hasn't lost weight recently, no unexplained fevers, chills or sweats. No unexplained waist changes, no early satiety. She does smoke and does relate hx of chronic boils in her underarms and groin. States always has these boils. Past Medical History:   Diagnosis Date    Anxiety     Biceps tendon tear     Depression     Frequent headaches     Hypertension     Joint pain     Kidney stones     Menopause     MRSA infection     knee    Nausea & vomiting     Palpitations     Thyroid disease     thyroid problem nodules on thyroid      Past Surgical History:   Procedure Laterality Date    HX  SECTION      3 of them    HX COLONOSCOPY  2004    Pt report, polyps BEKKESTUA    HX COLONOSCOPY  2018    polyp    HX ORTHOPAEDIC      torn knee left and right knee ,carpal tunnel    HX ORTHOPAEDIC      CTS, lt shoulder      Social History     Tobacco Use    Smoking status: Current Every Day Smoker     Packs/day: 1.00     Years: 30.00     Pack years: 30.00     Types: Cigarettes    Smokeless tobacco: Never Used   Substance Use Topics    Alcohol use:  Yes     Alcohol/week: 0.0 standard drinks     Frequency: 2-4 times a month     Drinks per session: 1 or 2     Comment: 5-6 drinks a week      Family History   Problem Relation Age of Onset    Cancer Other         luekemia    Cancer Mother         pancreatic    Hypertension Mother     Diabetes Father     Parkinson's Disease Father     Hypertension Brother     Hypertension Maternal Grandmother     Stroke Maternal Grandmother     Heart Disease Paternal Grandmother      Current Outpatient Medications   Medication Sig    escitalopram oxalate (LEXAPRO) 20 mg tablet TAKE TWO TABLETS BY MOUTH EVERY DAY TOTAL OF 40 MG PER DAY    albuterol (PROVENTIL HFA, VENTOLIN HFA, PROAIR HFA) 90 mcg/actuation inhaler Take 1 Puff by inhalation every four (4) hours as needed for Wheezing or Shortness of Breath.  amLODIPine (NORVASC) 5 mg tablet TAKE ONE TABLET BY MOUTH EVERY DAY    atorvastatin (LIPITOR) 20 mg tablet TAKE ONE TABLET BY MOUTH EVERY DAY    buPROPion XL (WELLBUTRIN XL) 150 mg tablet Take 1 Tab by mouth every morning.  lisinopril-hydroCHLOROthiazide (PRINZIDE, ZESTORETIC) 20-12.5 mg per tablet TAKE TWO TABLETS BY MOUTH EVERY DAY    melatonin 5 mg cap capsule Take 1 Cap by mouth nightly. Indications: INSOMNIA     No current facility-administered medications for this visit. Allergies   Allergen Reactions    Amoxicillin Itching        Review of Systems: A complete review of systems was obtained, negative except as described above. Physical Exam:     Visit Vitals  BP 97/61   Pulse 76   Temp 98 °F (36.7 °C) (Oral)   Resp 20   Ht 5' 6.25\" (1.683 m)   Wt 217 lb (98.4 kg)   SpO2 95%   BMI 34.76 kg/m²     ECOG PS: 0  General: No distress  Eyes: PERRLA, anicteric sclerae  HENT: Atraumatic, OP clear  Neck: Supple  Lymphatic: No cervical, supraclavicular, or inguinal adenopathy  Respiratory: CTAB, normal respiratory effort  CV: Normal rate, regular rhythm, no murmurs, no peripheral edema  GI: Soft, nontender, nondistended, no masses, no hepatomegaly, no splenomegaly  MS: Normal gait and station. Digits without clubbing or cyanosis.   Skin: No rashes, ecchymoses, or petechiae. Normal temperature, turgor, and texture. Psych: Alert, oriented, appropriate affect, normal judgment/insight    Results:     Lab Results   Component Value Date/Time    WBC 11.5 (H) 06/11/2019 11:42 AM    HGB 14.3 06/11/2019 11:42 AM    HCT 44.9 06/11/2019 11:42 AM    PLATELET 630 62/09/3288 11:42 AM    MCV 83 06/11/2019 11:42 AM    ABS. NEUTROPHILS 7.1 (H) 06/11/2019 11:42 AM     Lab Results   Component Value Date/Time    Sodium 142 05/28/2019 10:42 AM    Potassium 4.1 05/28/2019 10:42 AM    Chloride 102 05/28/2019 10:42 AM    CO2 24 05/28/2019 10:42 AM    Glucose 109 (H) 05/28/2019 10:42 AM    BUN 9 05/28/2019 10:42 AM    Creatinine 0.61 05/28/2019 10:42 AM    GFR est  05/28/2019 10:42 AM    GFR est non- 05/28/2019 10:42 AM    Calcium 9.8 05/28/2019 10:42 AM     Lab Results   Component Value Date/Time    Bilirubin, total 0.2 05/28/2019 10:42 AM    ALT (SGPT) 13 05/28/2019 10:42 AM    AST (SGOT) 13 05/28/2019 10:42 AM    Alk. phosphatase 73 05/28/2019 10:42 AM    Protein, total 6.9 05/28/2019 10:42 AM    Albumin 4.3 05/28/2019 10:42 AM           Assessment:   1) Leukocytosis       Plan:   1) Rule out CML, CLL. This could be reactive process from her recurrent boils (?hiradenitis supportiva?). Small elevations can also be due to smoking. Will get the labs and follow up in one month.       Signed By: Juli Taylor MD

## 2019-08-05 PROBLEM — D72.829 LEUKOCYTOSIS: Status: ACTIVE | Noted: 2019-08-05

## 2019-08-30 ENCOUNTER — OFFICE VISIT (OUTPATIENT)
Dept: ONCOLOGY | Age: 59
End: 2019-08-30

## 2019-08-30 VITALS
SYSTOLIC BLOOD PRESSURE: 122 MMHG | TEMPERATURE: 98 F | DIASTOLIC BLOOD PRESSURE: 80 MMHG | HEART RATE: 78 BPM | WEIGHT: 217 LBS | HEIGHT: 67 IN | BODY MASS INDEX: 34.06 KG/M2 | RESPIRATION RATE: 18 BRPM | OXYGEN SATURATION: 95 %

## 2019-08-30 DIAGNOSIS — D72.829 LEUKOCYTOSIS, UNSPECIFIED TYPE: Primary | ICD-10-CM

## 2019-08-30 NOTE — PROGRESS NOTES
Reason for Visit:   Trina Barclay is a 62 y.o. female who is seen for follow up leukocytosis. Treatment History:   Dx: Leukocytosis--elevated neutrophils and lymphocytes. Earliest dates back to 2016. Most recent values 11K-12k. No anemia or thrombocytopenia. Tx: Rule out CML, CLL    History of Present Illness:   Doing ok, anxious, but no changes. Past Medical History:   Diagnosis Date    Anxiety     Biceps tendon tear     Depression     Frequent headaches     Hypertension     Joint pain     Kidney stones     Menopause     MRSA infection     knee    Nausea & vomiting     Palpitations     Thyroid disease     thyroid problem nodules on thyroid      Past Surgical History:   Procedure Laterality Date    HX  SECTION      3 of them    HX COLONOSCOPY  2004    Pt report, polyps BEKKESTUA    HX COLONOSCOPY  2018    polyp    HX ORTHOPAEDIC      torn knee left and right knee ,carpal tunnel    HX ORTHOPAEDIC      CTS, lt shoulder      Social History     Tobacco Use    Smoking status: Current Every Day Smoker     Packs/day: 1.00     Years: 30.00     Pack years: 30.00     Types: Cigarettes    Smokeless tobacco: Never Used   Substance Use Topics    Alcohol use:  Yes     Alcohol/week: 0.0 standard drinks     Frequency: 2-4 times a month     Drinks per session: 1 or 2     Comment: 5-6 drinks a week      Family History   Problem Relation Age of Onset    Cancer Other         luekemia    Cancer Mother         pancreatic    Hypertension Mother     Diabetes Father     Parkinson's Disease Father     Hypertension Brother     Hypertension Maternal Grandmother     Stroke Maternal Grandmother     Heart Disease Paternal Grandmother      Current Outpatient Medications   Medication Sig    buPROPion XL (WELLBUTRIN XL) 150 mg tablet TAKE ONE TABLET BY MOUTH EVERY MORNING    escitalopram oxalate (LEXAPRO) 20 mg tablet TAKE TWO TABLETS BY MOUTH EVERY DAY TOTAL OF 40 MG PER DAY    albuterol (PROVENTIL HFA, VENTOLIN HFA, PROAIR HFA) 90 mcg/actuation inhaler Take 1 Puff by inhalation every four (4) hours as needed for Wheezing or Shortness of Breath.  amLODIPine (NORVASC) 5 mg tablet TAKE ONE TABLET BY MOUTH EVERY DAY    atorvastatin (LIPITOR) 20 mg tablet TAKE ONE TABLET BY MOUTH EVERY DAY    lisinopril-hydroCHLOROthiazide (PRINZIDE, ZESTORETIC) 20-12.5 mg per tablet TAKE TWO TABLETS BY MOUTH EVERY DAY    melatonin 5 mg cap capsule Take 1 Cap by mouth nightly. Indications: INSOMNIA     No current facility-administered medications for this visit. Allergies   Allergen Reactions    Amoxicillin Itching        Review of Systems: A complete review of systems was obtained, negative except as described above. Physical Exam:     Visit Vitals  /80   Pulse 78   Temp 98 °F (36.7 °C)   Resp 18   Ht 5' 6.5\" (1.689 m)   Wt 217 lb (98.4 kg)   SpO2 95%   BMI 34.50 kg/m²     ECOG PS: 0  General: No distress  Eyes: PERRLA, anicteric sclerae  HENT: Atraumatic, OP clear  Neck: Supple  Lymphatic: No cervical, supraclavicular, or inguinal adenopathy  Respiratory: CTAB, normal respiratory effort  CV: Normal rate, regular rhythm, no murmurs, no peripheral edema  GI: Soft, nontender, nondistended, no masses, no hepatomegaly, no splenomegaly  MS: Normal gait and station. Digits without clubbing or cyanosis. Skin: No rashes, ecchymoses, or petechiae. Normal temperature, turgor, and texture. Psych: Alert, oriented, appropriate affect, normal judgment/insight    Results:     Lab Results   Component Value Date/Time    WBC 11.9 (H) 08/02/2019 02:21 PM    HGB 13.6 08/02/2019 02:21 PM    HCT 41.9 08/02/2019 02:21 PM    PLATELET 291 48/29/8063 02:21 PM    MCV 82.3 08/02/2019 02:21 PM    ABS.  NEUTROPHILS 6.7 08/02/2019 02:21 PM     Lab Results   Component Value Date/Time    Sodium 142 05/28/2019 10:42 AM    Potassium 4.1 05/28/2019 10:42 AM    Chloride 102 05/28/2019 10:42 AM    CO2 24 05/28/2019 10:42 AM    Glucose 109 (H) 05/28/2019 10:42 AM    BUN 9 05/28/2019 10:42 AM    Creatinine 0.61 05/28/2019 10:42 AM    GFR est  05/28/2019 10:42 AM    GFR est non- 05/28/2019 10:42 AM    Calcium 9.8 05/28/2019 10:42 AM     Lab Results   Component Value Date/Time    Bilirubin, total 0.2 05/28/2019 10:42 AM    ALT (SGPT) 13 05/28/2019 10:42 AM    AST (SGOT) 13 05/28/2019 10:42 AM    Alk. phosphatase 73 05/28/2019 10:42 AM    Protein, total 6.9 05/28/2019 10:42 AM    Albumin 4.3 05/28/2019 10:42 AM     Flow Cytometry (CLL) and BCR/ABL(CML) negative. Assessment:   1) Leukocytosis         Plan:   1) Ruled out CML, CLL. This could be reactive process from her recurrent boils (?hiradenitis supportiva?). Small elevations can also be due to smoking. No need for further hematologic work up.        Signed By: Hansel Arauz MD

## 2019-08-30 NOTE — PROGRESS NOTES
Pt is a 62 yr Pt here for routine follow up. She reports feeling well, no complaints. Visit Vitals  /80   Pulse 78   Temp 98 °F (36.7 °C)   Resp 18   Ht 5' 6.5\" (1.689 m)   Wt 217 lb (98.4 kg)   SpO2 95%   BMI 34.50 kg/m²       Pain Scale: 0 - No pain/10  Pain Location:       Health Maintenance reviewed       1. Have you been to the ER, urgent care clinic since your last visit? Hospitalized since your last visit? no    2. Have you seen or consulted any other health care providers outside of the [address] [place] [address] since your last visit? Include any pap smears or colon screening.   no

## 2020-03-04 PROBLEM — E66.01 OBESITY, MORBID (HCC): Status: ACTIVE | Noted: 2020-03-04

## 2021-03-19 ENCOUNTER — PATIENT MESSAGE (OUTPATIENT)
Dept: FAMILY MEDICINE CLINIC | Age: 61
End: 2021-03-19

## 2021-03-24 NOTE — TELEPHONE ENCOUNTER
From: Meron Torres  To: Marcella Barnhart, DO  Sent: 3/19/2021 8:03 PM EDT  Subject: Update Medical Information    Hi Dr. German Roblero - you wanted me to follow up with you after a couple of weeks on the new medication:    Narvis Pata - definitely not the same as the adderal, however, I'm productive, and not as bad as I was. I just started the 2nd pill a day - took 1st day, forgot 2nd and 3rd, and remembered today. That will probably happen again. I have not heart from the Neurologist, but [facility]'d [facility], maybe even try it a 2nd month. May I request a refill when the time comes? Trazadone - haven't seen huge change. Would like to continue. Finally, I wanted to let you know (for my medical records) I did receive a flu shot in October 2020 at [facility].     Thanks very much - Lisbeth Merlos

## 2021-04-28 ENCOUNTER — TELEPHONE (OUTPATIENT)
Dept: FAMILY MEDICINE CLINIC | Age: 61
End: 2021-04-28

## 2021-04-28 RX ORDER — TRAZODONE HYDROCHLORIDE 100 MG/1
100 TABLET ORAL
Qty: 90 TAB | Refills: 1 | Status: SHIPPED | OUTPATIENT
Start: 2021-04-28 | End: 2021-10-26 | Stop reason: SDUPTHER

## 2021-04-28 RX ORDER — HYDROXYZINE HYDROCHLORIDE 10 MG/1
10 TABLET, FILM COATED ORAL
Qty: 30 TAB | Refills: 1 | Status: SHIPPED | OUTPATIENT
Start: 2021-04-28 | End: 2021-05-08

## 2021-04-28 RX ORDER — BUSPIRONE HYDROCHLORIDE 5 MG/1
10 TABLET ORAL
Qty: 90 TAB | Refills: 1 | Status: SHIPPED | OUTPATIENT
Start: 2021-04-28 | End: 2021-06-14 | Stop reason: ALTCHOICE

## 2021-04-28 NOTE — TELEPHONE ENCOUNTER
----- Message from April JEFF Abreu sent at 4/28/2021 11:01 AM EDT -----  Regarding: Reg San DO/telephone  General Message/Vendor Calls    Caller's first and last name:      Reason for call: Requested a call back        Callback required yes/no and why: Yes       Best contact number(s): [phone]      Details to clarify the request: Patient stated she went to get a refill on her medication at her pharmacy [facility] and they stated it wasn't time for a refill for Trazodone 50 mg. Dr. Trice Thompson up her medication so she take one pill during the day and two at night and she would like to know if Dr. Trice Thompson updated this information at her pharmacy. She is out of her medication and Dr. Trice Thompson advised her she could take the same medication as she dealing with the loss of her  as well. She would like a call back ASA.      April [address]

## 2021-04-30 ENCOUNTER — VIRTUAL VISIT (OUTPATIENT)
Dept: FAMILY MEDICINE CLINIC | Age: 61
End: 2021-04-30
Payer: COMMERCIAL

## 2021-04-30 DIAGNOSIS — F41.9 ANXIETY: Primary | ICD-10-CM

## 2021-04-30 PROCEDURE — 99213 OFFICE O/P EST LOW 20 MIN: CPT | Performed by: INTERNAL MEDICINE

## 2021-04-30 RX ORDER — CLONAZEPAM 0.5 MG/1
0.5 TABLET ORAL
Qty: 60 TAB | Refills: 1 | Status: SHIPPED | OUTPATIENT
Start: 2021-04-30 | End: 2021-06-14 | Stop reason: ALTCHOICE

## 2021-04-30 NOTE — PROGRESS NOTES
1. Have you been to the ER, urgent care clinic since your last visit? Hospitalized since your last visit? No    2. Have you seen or consulted any other health care providers outside of the [address] [place] [address] since your last visit? Include any pap smears or colon screening.  No         Learning Assessment 3/5/2021   PRIMARY LEARNER Patient   HIGHEST LEVEL OF EDUCATION - PRIMARY LEARNER  GRADUATED HIGH SCHOOL OR GED   BARRIERS PRIMARY LEARNER COGNITIVE   CO-LEARNER CAREGIVER No   PRIMARY LANGUAGE ENGLISH   LEARNER PREFERENCE PRIMARY READING     LISTENING   ANSWERED BY Patient    RELATIONSHIP SELF

## 2021-04-30 NOTE — PROGRESS NOTES
Consent:  She and/or her healthcare decision maker is aware that this patient-initiated Telehealth encounter is a billable service, with coverage as determined by her insurance carrier. She is aware that she may receive a bill and has provided verbal consent to proceed: Yes    I was in the office while conducting this encounter via the doxy. me platform. Adrianne Griffith is a 61 y.o. female who was seen by synchronous (real-time) audio-video technology on 4/30/2021. Pt was seen at home. No other participants in this encounter. Subjective:   No chief complaint on file. Presents virtually today to request starting back on a medication that she had previously been \"taking for years\". She had been on a time released capsule and didn't have any issues. She was diagnoses with ADD by a neurologist at the time and had been started on the medication. She felt like the medication kept her focused and she was able to get things done. She has noted that since stopping the medication she has been more depressed, less motivated, and \"doesn't get out and do things\". Took some of her daughter's adderall which relieved her symptoms. Section A (SCREEN)  often <-- 1. Trouble wrapping up the final details of a project, once the challenging parts have been done?  sometimes <-- 2. Difficulty getting things in order when you have to do a task that requires organization?  sometimes <-- 3. Problems remembering appointments or obligations? often <-- 4. When you have a task that requires a lot of thought, how often do you avoid or delay getting started?  sometimes <-- 5. Fidget or squirm with your hands or feet when you have to sit down for a long time?  never <-- 6. Feel overly active and compelled to do things, like you were driven by a motor? PMH, SH, Medications/Allergies: reviewed, on chart. Current Outpatient Medications   Medication Sig    traZODone (DESYREL) 100 mg tablet Take 1 Tab by mouth nightly.  Take 1/2 to one hour prior to bedtime  Indications: insomnia associated with depression    busPIRone (BUSPAR) 5 mg tablet Take 2 Tabs by mouth three (3) times daily (with meals).  hydrOXYzine HCL (ATARAX) 10 mg tablet Take 1 Tab by mouth three (3) times daily as needed for Anxiety for up to 10 days. Indications: anxious    atomoxetine (STRATTERA) 40 mg capsule Take 1 Cap by mouth two (2) times a day.  amLODIPine (NORVASC) 5 mg tablet TAKE 1 TABLET BY MOUTH DAILY    lisinopril-hydroCHLOROthiazide (PRINZIDE, ZESTORETIC) 20-12.5 mg per tablet TAKE 2 TABLETS BY MOUTH DAILY    atorvastatin (LIPITOR) 20 mg tablet TAKE 1 TABLET BY MOUTH DAILY    escitalopram oxalate (LEXAPRO) 20 mg tablet TAKE 2 TABLET BY MOUTH EVERY DAY    buPROPion XL (WELLBUTRIN XL) 300 mg XL tablet Take 1 Tab by mouth every morning.  albuterol (PROVENTIL HFA, VENTOLIN HFA, PROAIR HFA) 90 mcg/actuation inhaler Take 1 Puff by inhalation every four (4) hours as needed for Wheezing or Shortness of Breath.  melatonin 5 mg cap capsule Take 1 Cap by mouth nightly. Indications: INSOMNIA     No current facility-administered medications for this visit. Allergies   Allergen Reactions    Amoxicillin Itching       ROS:  Constitutional: No fever, chills or abnormal weight loss  Respiratory: No cough, SOB   CV: No chest pain or Palpitations    VS review:   Wt Readings from Last 3 Encounters:   03/04/20 276 lb (125.2 kg)   02/05/20 216 lb 9.6 oz (98.2 kg)   08/30/19 217 lb (98.4 kg)     BP Readings from Last 3 Encounters:   03/04/20 126/70   02/05/20 90/60   08/30/19 122/80       Objective:     General: alert, cooperative, no distress   Mental  status: mental status: alert, oriented to person, place, and time, normal mood, behavior, speech, dress, motor activity, and thought processes   Resp: resp: normal effort and no respiratory distress   Neuro: neuro: no gross deficits   Skin: skin: no discoloration or lesions of concern on visible areas Assessment & Plan: Will try strattera - may work better in combination with wellbutrin, given concomitant depression. Also discussed that with the history of hypertension, we would want to try and stay away from stimulants if possible. She has not tried strattera previously and is willing to try Strattera and we will re-evaluate    As an oh, by the way, she also states that she has difficulty staying asleep at night. Is able to fall asleep but not fall asleep. She is interested in trying melatonin; consider sleep study, referral to sleep medicine. Sleep hygiene  Sleep hygiene refers to actions that tend to improve and maintain good sleep[de-identified]  Sleep as long as necessary to feel rested (usually seven to eight hours for adults) and then get out of bed  Maintain a regular sleep schedule, particularly a regular wake-up time in the morning  Try not to force sleep  Avoid caffeinated beverages after lunch  Avoid alcohol near bedtime (eg, late afternoon and evening)  Avoid smoking or other nicotine intake, particularly during the evening  Adjust the bedroom environment as needed to decrease stimuli (eg, reduce ambient light, turn off the television or radio)  Avoid prolonged use of light-emitting screens (laptops, tablets, smartphones, ebooks) before bedtime   Resolve concerns or worries before bedtime  Exercise regularly for at least 20 minutes, preferably more than four to five hours prior to bedtime   Avoid daytime naps, especially if they are longer than 20 to 30 minutes or occur late in the day      No orders of the defined types were placed in this encounter. Kyra Quan DO      Due to this being a TeleHealth evaluation, many elements of the physical examination are unable to be assessed. We discussed the expected course, resolution and complications of the diagnosis(es) in detail. Medication risks, benefits, costs, interactions, and alternatives were discussed as indicated.   I advised her to contact the office if her condition worsens, changes or fails to improve as anticipated. She expressed understanding with the diagnosis(es) and plan. Pursuant to the emergency declaration under the [address] [place] [address], [facility] waiver authority and the [employer] Act, this Virtual  Visit was conducted, with patient's consent, to reduce the patient's risk of exposure to COVID-19 and provide continuity of care for an established patient. Services were provided through a video synchronous discussion virtually to substitute for in-person clinic visit. Services were provided through a synchronous discussion virtually to substitute for in-person clinic visit. I was in the office. The patient was at home.     CPT Codes 83363-32893 for Established Patients may apply to this Telehealth Visit

## 2021-05-01 NOTE — PROGRESS NOTES
Dione Gray evaluated via telephone on 21     Consent:  He and/or health care decision maker is aware that that he may receive a bill for this telephone service, depending on his insurance coverage, and has provided verbal consent to proceed: Yes      Documentation:  I communicated with the patient and/or health care decision maker about Anxiety and grief at the loss of her . Details of this discussion including any medical advice provided: patient presents today to discuss medication options regarding anxiety and grief. Her  recently suffered a heart attack and passed away. She has a history of anxiety and depression, and is having difficulty with sleeping in caring out the things that she needs to do for his . She has children with her, good support network. We discussed increasing her BuSpar, as well as her trazodone. Lexapro is at its maximum. also add an XT IDPRN for anxiety as well as Clonopin but I encouraged her to only take at night or when absolutely necessary. She will schedule an appointment to revisit the medication after the . she would also like to follow up      I affirm this is a Patient Initiated Episode with an Established Patient who has not had a related appointment within my department in the past 7 days or scheduled within the next 24 hours.     Total Time: minutes: 11-20 minutes    Note: not billable if this call serves to triage the patient into an appointment for the relevant concern      Caroline Menard, DO

## 2021-06-14 ENCOUNTER — OFFICE VISIT (OUTPATIENT)
Dept: FAMILY MEDICINE CLINIC | Age: 61
End: 2021-06-14
Payer: COMMERCIAL

## 2021-06-14 VITALS
DIASTOLIC BLOOD PRESSURE: 76 MMHG | WEIGHT: 202.6 LBS | RESPIRATION RATE: 18 BRPM | HEIGHT: 67 IN | TEMPERATURE: 98.1 F | BODY MASS INDEX: 31.8 KG/M2 | OXYGEN SATURATION: 96 % | HEART RATE: 90 BPM | SYSTOLIC BLOOD PRESSURE: 136 MMHG

## 2021-06-14 DIAGNOSIS — Z12.31 SCREENING MAMMOGRAM, ENCOUNTER FOR: ICD-10-CM

## 2021-06-14 DIAGNOSIS — F90.9 ATTENTION DEFICIT HYPERACTIVITY DISORDER (ADHD), UNSPECIFIED ADHD TYPE: Primary | ICD-10-CM

## 2021-06-14 DIAGNOSIS — M79.641 RIGHT HAND PAIN: ICD-10-CM

## 2021-06-14 PROCEDURE — 99214 OFFICE O/P EST MOD 30 MIN: CPT | Performed by: NURSE PRACTITIONER

## 2021-06-14 RX ORDER — DEXTROAMPHETAMINE SACCHARATE, AMPHETAMINE ASPARTATE MONOHYDRATE, DEXTROAMPHETAMINE SULFATE AND AMPHETAMINE SULFATE 2.5; 2.5; 2.5; 2.5 MG/1; MG/1; MG/1; MG/1
10 CAPSULE, EXTENDED RELEASE ORAL
Qty: 30 CAPSULE | Refills: 0 | Status: SHIPPED | OUTPATIENT
Start: 2021-06-14 | End: 2021-07-12 | Stop reason: SDUPTHER

## 2021-06-14 NOTE — PROGRESS NOTES
Chief Complaint   Patient presents with    Hand Pain     R hand/shoulder pain       HPI:     is a 61 y.o. female who presents today for chronic f/u, previously followed by Dr. Ginny Friedman. HLD: On atorvastatin, due for lipid panel. Not fasting today. HTN: On lisinopril, HCTZ, and amlodipine. Anxiety: Long hx, worsened since the sudden passing of her  about a month ago from a MI. Currently on Lexapro 40 mg. Insomnia: Using melatonin 5 mg and trazodone 100 mg with relief. New issues: Patient notes a hx of ADHD, dx in 2011 and was on Adderall 40 mg for about five years before she stopped. She wants to restart this. She notes difficulty with concentration, completing tasks. She went to a psychiatrist in [address] [place] [address] and was re-evaluated. He confirmed the diagnosis of ADHD. See scanned. She wants to restart Adderall. Previous PCP prescribed Strattera but patient did not notice any improvement so she stopped taking it. The patient also reports R hand pain in the thumb for several months, worse when making a  or trying to use the hand. No known injury. Occasionally, it seems to radiate up in to her R shoulder. She lives alone here since the passing of her . She has two children in [address] [facility] and a strong social network locally. Allergies   Allergen Reactions    Amoxicillin Itching       Current Outpatient Medications   Medication Sig    amphetamine-dextroamphetamine XR (ADDERALL XR) 10 mg XR capsule Take 1 Capsule by mouth every morning. Max Daily Amount: 10 mg.    escitalopram oxalate (LEXAPRO) 20 mg tablet TAKE 2 TABLET BY MOUTH EVERY DAY    atorvastatin (LIPITOR) 20 mg tablet TAKE 1 TABLET BY MOUTH DAILY    traZODone (DESYREL) 100 mg tablet Take 1 Tab by mouth nightly.  Take 1/2 to one hour prior to bedtime  Indications: insomnia associated with depression    amLODIPine (NORVASC) 5 mg tablet TAKE 1 TABLET BY MOUTH DAILY    lisinopril-hydroCHLOROthiazide (PRINZIDE, ZESTORETIC) 20-12.5 mg per tablet TAKE 2 TABLETS BY MOUTH DAILY    albuterol (PROVENTIL HFA, VENTOLIN HFA, PROAIR HFA) 90 mcg/actuation inhaler Take 1 Puff by inhalation every four (4) hours as needed for Wheezing or Shortness of Breath.  melatonin 5 mg cap capsule Take 1 Cap by mouth nightly. Indications: INSOMNIA     No current facility-administered medications for this visit. Past Medical History:   Diagnosis Date    Anxiety     Biceps tendon tear     Depression     Frequent headaches     Hypertension     Joint pain     Kidney stones     Menopause     MRSA infection 2013    knee    Nausea & vomiting     Palpitations     Thyroid disease     thyroid problem nodules on thyroid       Family History   Problem Relation Age of Onset    Cancer Other         luekemia    Cancer Mother         pancreatic    Hypertension Mother     Diabetes Father     Parkinson's Disease Father     Hypertension Brother     Hypertension Maternal Grandmother     Stroke Maternal Grandmother     Heart Disease Paternal Grandmother        ROS:  Denies fever, chills, cough, chest pain, SOB,  nausea, vomiting, diarrhea, dysuria. Denies rashes, wounds, + arthralgias, weakness, numbness, visual changes, + depression. Denies wt loss, wt gain, hemoptysis, hematochezia or melena. Patient is not experiencing chest pain radiating to the jaw and/or down the arms. Physical Examination:    /76 (BP 1 Location: Right arm, BP Patient Position: Sitting, BP Cuff Size: Adult)   Pulse 90   Temp 98.1 °F (36.7 °C) (Temporal)   Resp 18   Ht 5' 6.5\" (1.689 m)   Wt 202 lb 9.6 oz (91.9 kg)   SpO2 96%   BMI 32.21 kg/m²     Wt Readings from Last 3 Encounters:   06/14/21 202 lb 9.6 oz (91.9 kg)   03/04/20 276 lb (125.2 kg)   02/05/20 216 lb 9.6 oz (98.2 kg)     Constitutional: WDWN Female in no acute distress  HENT:  NC/AT  EYES: EOMI, PERRL  Neck:  Supple, no JVD, mass or bruit. No thyromegaly.   Respiratory: Respirations even and unlabored without use of accessory muscles, CTA throughout without wheezes, rales, rubs or rhonchi. Symmetrical chest expansion. Cardiac: RRR no clicks, murmurs, gallops, or rubs  Musculoskeletal:  No cyanosis, clubbing or edema of extremities. Moves all extremities without difficulty. Tenderness over R 1st MCP. Neurologic:  Smooth, even gait without assistance, CN 2-12 grossly intact. Skin: intact and warm to the touch, no rash   Lymphadenopathy: no cervical or supraclavicular nodes  Psych: Pleasant and appropriate. Judgment normal. Sl anxious, tearful at times. Alert and oriented x 3. ASSESSMENT AND PLAN:       ICD-10-CM ICD-9-CM    1. Attention deficit hyperactivity disorder (ADHD), unspecified ADHD type  F90.9 314.01 amphetamine-dextroamphetamine XR (ADDERALL XR) 10 mg XR capsule   2. Screening mammogram, encounter for  Z12.31 V76.12 TRACY 3D COLLEEN W MAMMO BI SCREENING INCL CAD   3. Right hand pain  M79.641 729.5 XR HAND RT MIN 3 V     Reviewed history, medical records . Medications reconciled. Controlled substance agreement signed. Will start on low dose Adderall XR and titrate up as necessary. Reviewed the office policies. Will need UDS at next visit. Due for labs but she is not fasting. We will get these at f/u. Pap due, she will schedule. Mammogram ordered at [facility]. Discussed R thumb pain, suspect OA but will get imaging. Consider referral to Dr. Anoop Jenkins for possible injection. Patient aware of plan of care and verbalized understanding. Questions answered. RTC 1 month, or sooner if needed.     Leo Fleming NP

## 2021-06-14 NOTE — PATIENT INSTRUCTIONS

## 2021-06-24 ENCOUNTER — TELEPHONE (OUTPATIENT)
Dept: FAMILY MEDICINE CLINIC | Age: 61
End: 2021-06-24

## 2021-06-24 NOTE — TELEPHONE ENCOUNTER
----- Message from Radha Pham sent at 6/24/2021  4:42 PM EDT -----  Regarding: NP, [place]/Telephone  Env General Message/Vendor Calls    Caller's first and last name:      Reason for call: Regarding scheduling for mammogram.       Call back required yes/no and why: Yes       Best contact number(s): [phone]      Details to clarify the request:      Radha Pham

## 2021-06-25 NOTE — TELEPHONE ENCOUNTER
Returned call to pt, advised of phone number to [facility] to schedule mammogram. Pt states that she is going to call and set up her appt.  KT

## 2021-07-12 DIAGNOSIS — F90.9 ATTENTION DEFICIT HYPERACTIVITY DISORDER (ADHD), UNSPECIFIED ADHD TYPE: ICD-10-CM

## 2021-07-12 RX ORDER — DEXTROAMPHETAMINE SACCHARATE, AMPHETAMINE ASPARTATE MONOHYDRATE, DEXTROAMPHETAMINE SULFATE AND AMPHETAMINE SULFATE 2.5; 2.5; 2.5; 2.5 MG/1; MG/1; MG/1; MG/1
10 CAPSULE, EXTENDED RELEASE ORAL
Qty: 30 CAPSULE | Refills: 0 | Status: SHIPPED | OUTPATIENT
Start: 2021-07-12 | End: 2021-08-05 | Stop reason: SDUPTHER

## 2021-07-19 ENCOUNTER — OFFICE VISIT (OUTPATIENT)
Dept: FAMILY MEDICINE CLINIC | Age: 61
End: 2021-07-19
Payer: COMMERCIAL

## 2021-07-19 ENCOUNTER — HOSPITAL ENCOUNTER (OUTPATIENT)
Dept: LAB | Age: 61
Discharge: HOME | End: 2021-07-19
Payer: COMMERCIAL

## 2021-07-19 VITALS
OXYGEN SATURATION: 97 % | TEMPERATURE: 98.1 F | DIASTOLIC BLOOD PRESSURE: 76 MMHG | BODY MASS INDEX: 31.08 KG/M2 | HEIGHT: 67 IN | WEIGHT: 198 LBS | RESPIRATION RATE: 17 BRPM | HEART RATE: 72 BPM | SYSTOLIC BLOOD PRESSURE: 132 MMHG

## 2021-07-19 DIAGNOSIS — Z12.4 SCREENING FOR CERVICAL CANCER: Primary | ICD-10-CM

## 2021-07-19 DIAGNOSIS — F90.9 ATTENTION DEFICIT HYPERACTIVITY DISORDER (ADHD), UNSPECIFIED ADHD TYPE: ICD-10-CM

## 2021-07-19 DIAGNOSIS — M79.641 RIGHT HAND PAIN: ICD-10-CM

## 2021-07-19 DIAGNOSIS — Z13.220 SCREENING, LIPID: ICD-10-CM

## 2021-07-19 DIAGNOSIS — Z01.419 WELL WOMAN EXAM: ICD-10-CM

## 2021-07-19 DIAGNOSIS — I10 ESSENTIAL HYPERTENSION: ICD-10-CM

## 2021-07-19 DIAGNOSIS — E55.9 VITAMIN D DEFICIENCY: ICD-10-CM

## 2021-07-19 PROCEDURE — 99396 PREV VISIT EST AGE 40-64: CPT | Performed by: NURSE PRACTITIONER

## 2021-07-19 PROCEDURE — 87624 HPV HI-RISK TYP POOLED RSLT: CPT

## 2021-07-19 PROCEDURE — 36415 COLL VENOUS BLD VENIPUNCTURE: CPT | Performed by: NURSE PRACTITIONER

## 2021-07-19 NOTE — PROGRESS NOTES
Chief Complaint   Patient presents with    Well Woman       HPI:     is a 61 y.o. female who presents today for her well woman exam.    HLD: On atorvastatin, due for lipid panel. Fasting today. HTN: On lisinopril, HCTZ, and amlodipine. Anxiety: Long hx, worsened since the sudden passing of her  about a month ago from a MI. Currently on Lexapro 40 mg. Insomnia: Using melatonin 5 mg and trazodone 100 mg with relief. ADHD: dx in 2011 and was on Adderall 40 mg for about five years before she stopped. She went to a psychiatrist in [place] and was re-evaluated. He confirmed the diagnosis of ADHD. See scanned. Tried Strattera without relief. We started her on Adderall last month. She reports some improvement but not to the extent she was hoping for. New issues: In today for her annual exam with fasting labs and pap smear. Last pap was normal about three years ago. She lives alone here since the passing of her . She has two children in [address] [facility] and a strong social network locally. Allergies   Allergen Reactions    Amoxicillin Itching       Current Outpatient Medications   Medication Sig    amphetamine-dextroamphetamine XR (ADDERALL XR) 10 mg XR capsule Take 1 Capsule by mouth every morning. Max Daily Amount: 10 mg.    lisinopril-hydroCHLOROthiazide (PRINZIDE, ZESTORETIC) 20-12.5 mg per tablet TAKE 2 TABLETS BY MOUTH DAILY    amLODIPine (NORVASC) 5 mg tablet TAKE 1 TABLET BY MOUTH DAILY    escitalopram oxalate (LEXAPRO) 20 mg tablet TAKE 2 TABLET BY MOUTH EVERY DAY    atorvastatin (LIPITOR) 20 mg tablet TAKE 1 TABLET BY MOUTH DAILY    traZODone (DESYREL) 100 mg tablet Take 1 Tab by mouth nightly. Take 1/2 to one hour prior to bedtime  Indications: insomnia associated with depression    albuterol (PROVENTIL HFA, VENTOLIN HFA, PROAIR HFA) 90 mcg/actuation inhaler Take 1 Puff by inhalation every four (4) hours as needed for Wheezing or Shortness of Breath.  melatonin 5 mg cap capsule Take 1 Cap by mouth nightly. Indications: INSOMNIA     No current facility-administered medications for this visit. Past Medical History:   Diagnosis Date    Anxiety     Biceps tendon tear     Depression     Frequent headaches     Hypertension     Joint pain     Kidney stones     Menopause     MRSA infection 2013    knee    Nausea & vomiting     Palpitations     Thyroid disease     thyroid problem nodules on thyroid       Family History   Problem Relation Age of Onset    Cancer Other         luekemia    Cancer Mother         pancreatic    Hypertension Mother     Diabetes Father     Parkinson's Disease Father     Hypertension Brother     Hypertension Maternal Grandmother     Stroke Maternal Grandmother     Heart Disease Paternal Grandmother        ROS:  Denies fever, chills, cough, chest pain, SOB,  nausea, vomiting, diarrhea, dysuria. Denies rashes, wounds, arthralgias, weakness, numbness, visual changes, depression. Denies wt loss, wt gain, hemoptysis, hematochezia or melena. Patient is not experiencing chest pain radiating to the jaw and/or down the arms. Physical Examination:    /76 (BP 1 Location: Right arm, BP Patient Position: Sitting, BP Cuff Size: Adult)   Pulse 72   Temp 98.1 °F (36.7 °C) (Temporal)   Resp 17   Ht 5' 6.5\" (1.689 m)   Wt 198 lb (89.8 kg)   SpO2 97%   BMI 31.48 kg/m²     Wt Readings from Last 3 Encounters:   07/19/21 198 lb (89.8 kg)   06/14/21 202 lb 9.6 oz (91.9 kg)   03/04/20 276 lb (125.2 kg)     Constitutional: WDWN Female in no acute distress  HENT:  NC/AT  EYES: EOMI, PERRL  Neck:  Supple, no JVD, mass or bruit. No thyromegaly. Breasts: Without palpable masses, axillary lymphadenopathy. No skin changes. Respiratory:  Respirations even and unlabored without use of accessory muscles, CTA throughout without wheezes, rales, rubs or rhonchi. Symmetrical chest expansion.   Cardiac: RRR no clicks, murmurs, gallops, or rubs  Musculoskeletal:  No cyanosis, clubbing or edema of extremities. Moves all extremities without difficulty. : Vaginal walls pink with rugae, cervix smooth, pink, with closed os. Pap specimen obtained. Bimanual without CMT, adnexal tenderness or uterine tenderness. Neurologic:  Smooth, even gait without assistance, CN 2-12 grossly intact. Skin: intact and warm to the touch, no rash   Lymphadenopathy: no cervical or supraclavicular nodes  Psych: Pleasant and appropriate. Judgment normal. Sl anxious, tearful at times. Alert and oriented x 3. ASSESSMENT AND PLAN:       ICD-10-CM ICD-9-CM    1. Screening for cervical cancer  Z12.4 V76.2    2. Well woman exam  Z01.419 V72.31    3. Attention deficit hyperactivity disorder (ADHD), unspecified ADHD type  F90.9 314.01    4. Right hand pain  M79.641 729.5    5. Screening, lipid  Z13.220 V77.91      1. ADHD: Controlled substance agreement on file, UDS up to date. Patient recently filled Rx for 10 mg dose of Adderall. Will anticipate increasing to 20 mg in 1 month. She will call for a refill. 2. She is heading to [facility] on Friday for her mammogram and the xray of her R hand. Anticipate ortho eval if xray is not revealing. 3. Chronic labs today, pap sent to lab. Patient aware of plan of care and verbalized understanding. Questions answered. RTC in November for a check up, or sooner if needed.     Vern Glass NP

## 2021-07-20 LAB
25(OH)D3 SERPL-MCNC: 36.7 NG/ML (ref 30–100)
ALBUMIN SERPL-MCNC: 4.2 G/DL (ref 3.5–5)
ALBUMIN/GLOB SERPL: 1.4 {RATIO} (ref 1.1–2.2)
ALP SERPL-CCNC: 77 U/L (ref 45–117)
ALT SERPL-CCNC: 18 U/L (ref 12–78)
ANION GAP SERPL CALC-SCNC: 6 MMOL/L (ref 5–15)
AST SERPL-CCNC: 14 U/L (ref 15–37)
BASOPHILS # BLD: 0.1 K/UL (ref 0–0.1)
BASOPHILS NFR BLD: 1 % (ref 0–1)
BILIRUB SERPL-MCNC: 0.3 MG/DL (ref 0.2–1)
BUN SERPL-MCNC: 10 MG/DL (ref 6–20)
BUN/CREAT SERPL: 16 (ref 12–20)
CALCIUM SERPL-MCNC: 10.3 MG/DL (ref 8.5–10.1)
CHLORIDE SERPL-SCNC: 105 MMOL/L (ref 97–108)
CHOLEST SERPL-MCNC: 181 MG/DL
CO2 SERPL-SCNC: 28 MMOL/L (ref 21–32)
CREAT SERPL-MCNC: 0.61 MG/DL (ref 0.55–1.02)
DIFFERENTIAL METHOD BLD: ABNORMAL
EOSINOPHIL # BLD: 0.2 K/UL (ref 0–0.4)
EOSINOPHIL NFR BLD: 2 % (ref 0–7)
ERYTHROCYTE [DISTWIDTH] IN BLOOD BY AUTOMATED COUNT: 15.4 % (ref 11.5–14.5)
GLOBULIN SER CALC-MCNC: 3 G/DL (ref 2–4)
GLUCOSE SERPL-MCNC: 106 MG/DL (ref 65–100)
HCT VFR BLD AUTO: 46.7 % (ref 35–47)
HDLC SERPL-MCNC: 65 MG/DL
HDLC SERPL: 2.8 {RATIO} (ref 0–5)
HGB BLD-MCNC: 14.2 G/DL (ref 11.5–16)
IMM GRANULOCYTES # BLD AUTO: 0 K/UL (ref 0–0.04)
IMM GRANULOCYTES NFR BLD AUTO: 0 % (ref 0–0.5)
LDLC SERPL CALC-MCNC: 93.2 MG/DL (ref 0–100)
LYMPHOCYTES # BLD: 2.7 K/UL (ref 0.8–3.5)
LYMPHOCYTES NFR BLD: 27 % (ref 12–49)
MCH RBC QN AUTO: 27.6 PG (ref 26–34)
MCHC RBC AUTO-ENTMCNC: 30.4 G/DL (ref 30–36.5)
MCV RBC AUTO: 90.7 FL (ref 80–99)
MONOCYTES # BLD: 0.8 K/UL (ref 0–1)
MONOCYTES NFR BLD: 8 % (ref 5–13)
NEUTS SEG # BLD: 6.3 K/UL (ref 1.8–8)
NEUTS SEG NFR BLD: 62 % (ref 32–75)
NRBC # BLD: 0 K/UL (ref 0–0.01)
NRBC BLD-RTO: 0 PER 100 WBC
PLATELET # BLD AUTO: 306 K/UL (ref 150–400)
PMV BLD AUTO: 11.6 FL (ref 8.9–12.9)
POTASSIUM SERPL-SCNC: 4.3 MMOL/L (ref 3.5–5.1)
PROT SERPL-MCNC: 7.2 G/DL (ref 6.4–8.2)
RBC # BLD AUTO: 5.15 M/UL (ref 3.8–5.2)
SODIUM SERPL-SCNC: 139 MMOL/L (ref 136–145)
TRIGL SERPL-MCNC: 114 MG/DL (ref ?–150)
TSH SERPL DL<=0.05 MIU/L-ACNC: 1.2 UIU/ML (ref 0.36–3.74)
VLDLC SERPL CALC-MCNC: 22.8 MG/DL
WBC # BLD AUTO: 9.9 K/UL (ref 3.6–11)

## 2021-07-20 NOTE — PROGRESS NOTES
Patient verified by stating name and date of birth.  Patient informed of lab results and states understanding per Richard Madrigal

## 2021-07-23 ENCOUNTER — HOSPITAL ENCOUNTER (OUTPATIENT)
Dept: MAMMOGRAPHY | Age: 61
Discharge: HOME | End: 2021-07-23
Payer: COMMERCIAL

## 2021-07-23 ENCOUNTER — HOSPITAL ENCOUNTER (OUTPATIENT)
Dept: GENERAL RADIOLOGY | Age: 61
Discharge: HOME | End: 2021-07-23
Payer: COMMERCIAL

## 2021-07-23 DIAGNOSIS — Z12.31 SCREENING MAMMOGRAM, ENCOUNTER FOR: ICD-10-CM

## 2021-07-23 DIAGNOSIS — M79.641 RIGHT HAND PAIN: ICD-10-CM

## 2021-07-23 LAB
CYTOLOGIST CVX/VAG CYTO: NORMAL
CYTOLOGY CVX/VAG DOC THIN PREP: NORMAL
DRUGS UR: NORMAL
HPV APTIMA: NEGATIVE
Lab: NORMAL
PATH REPORT.FINAL DX SPEC: NORMAL
STAT OF ADQ CVX/VAG CYTO-IMP: NORMAL

## 2021-07-23 PROCEDURE — 77063 BREAST TOMOSYNTHESIS BI: CPT

## 2021-07-23 PROCEDURE — 73130 X-RAY EXAM OF HAND: CPT

## 2021-07-30 ENCOUNTER — TELEPHONE (OUTPATIENT)
Dept: FAMILY MEDICINE CLINIC | Age: 61
End: 2021-07-30

## 2021-07-30 RX ORDER — DICLOFENAC SODIUM 50 MG/1
50 TABLET, DELAYED RELEASE ORAL
Qty: 60 TABLET | Refills: 0 | Status: SHIPPED | OUTPATIENT
Start: 2021-07-30 | End: 2021-08-29

## 2021-07-30 NOTE — TELEPHONE ENCOUNTER
Results were sent via [employer] [facility] on 7/26:    \"Your urine drug test was positive for THC, which is most commonly found in marijuana. If you are using marijuana, you will need to stop as long as you are getting your Adderall from our office. That's the [place]. We will re-check your urine at a later date. Your mammogram was normal. Repeat annually. Your hand xray shows arthritic changes, nothing acute. The first step would be to try a prescription medication for inflammation and arthritis. Send me a message back and let me know what you think. \"

## 2021-07-30 NOTE — TELEPHONE ENCOUNTER
----- Message from Clayton Umanzor sent at 7/30/2021 10:15 AM EDT -----  Regarding: Kranthi Silverio/telephone  General Message/Vendor Calls    Caller's first and last name:      Reason for call: Status of mammogram and xray      Callback required yes/no and why: yes      Best contact number(s): [phone]      Details to clarify the request: Requesting dx testing results      Clayton Umanozr

## 2021-07-30 NOTE — TELEPHONE ENCOUNTER
Patient advised per Geraldine Bliss.  Patient states that she would like to try the antiinflammatory medication,please send into pharmacy per patients request.

## 2021-08-05 ENCOUNTER — TELEPHONE (OUTPATIENT)
Dept: FAMILY MEDICINE CLINIC | Age: 61
End: 2021-08-05

## 2021-08-05 DIAGNOSIS — F90.9 ATTENTION DEFICIT HYPERACTIVITY DISORDER (ADHD), UNSPECIFIED ADHD TYPE: ICD-10-CM

## 2021-08-05 RX ORDER — DEXTROAMPHETAMINE SACCHARATE, AMPHETAMINE ASPARTATE MONOHYDRATE, DEXTROAMPHETAMINE SULFATE AND AMPHETAMINE SULFATE 5; 5; 5; 5 MG/1; MG/1; MG/1; MG/1
20 CAPSULE, EXTENDED RELEASE ORAL
Qty: 30 CAPSULE | Refills: 0 | Status: SHIPPED | OUTPATIENT
Start: 2021-08-05 | End: 2021-11-19 | Stop reason: ALTCHOICE

## 2021-08-05 NOTE — TELEPHONE ENCOUNTER
This patient called and stated that she is need of a refill for her Adderall.      She uses the [facility] in [place]    Thank you

## 2021-08-25 DIAGNOSIS — F90.9 ATTENTION DEFICIT HYPERACTIVITY DISORDER (ADHD), UNSPECIFIED ADHD TYPE: ICD-10-CM

## 2021-08-25 RX ORDER — ESCITALOPRAM OXALATE 20 MG/1
TABLET ORAL
Qty: 180 TABLET | Refills: 1 | Status: SHIPPED | OUTPATIENT
Start: 2021-08-25 | End: 2021-11-19 | Stop reason: ALTCHOICE

## 2021-08-25 RX ORDER — DEXTROAMPHETAMINE SACCHARATE, AMPHETAMINE ASPARTATE MONOHYDRATE, DEXTROAMPHETAMINE SULFATE AND AMPHETAMINE SULFATE 5; 5; 5; 5 MG/1; MG/1; MG/1; MG/1
20 CAPSULE, EXTENDED RELEASE ORAL
Qty: 30 CAPSULE | Refills: 0 | OUTPATIENT
Start: 2021-08-25

## 2021-08-25 RX ORDER — AMLODIPINE BESYLATE 5 MG/1
5 TABLET ORAL DAILY
Qty: 90 TABLET | Refills: 3 | Status: SHIPPED | OUTPATIENT
Start: 2021-08-25 | End: 2022-08-29

## 2021-08-26 NOTE — TELEPHONE ENCOUNTER
Ms Melanie Vitale was called informed of Halie's response and that she probably can do as Hannah Patterson had stated previously that she can do another UDS sometime later from her previous result. She stated OK thanks, she will set something up next week to come back by to do an updated UDS sometime next week. I stated to her it will just be a nurse OV.

## 2021-08-29 RX ORDER — DICLOFENAC SODIUM 50 MG/1
TABLET, DELAYED RELEASE ORAL
Qty: 60 TABLET | Refills: 0 | Status: SHIPPED | OUTPATIENT
Start: 2021-08-29

## 2021-10-26 RX ORDER — TRAZODONE HYDROCHLORIDE 50 MG/1
TABLET ORAL
Qty: 90 TABLET | Refills: 0 | Status: SHIPPED | OUTPATIENT
Start: 2021-10-26 | End: 2021-11-11 | Stop reason: ALTCHOICE

## 2021-10-26 NOTE — TELEPHONE ENCOUNTER
Message routed to provider to approve refill.     Requested Prescriptions     Pending Prescriptions Disp Refills    traZODone (DESYREL) 50 mg tablet 90 Tablet 1

## 2021-11-19 ENCOUNTER — OFFICE VISIT (OUTPATIENT)
Dept: FAMILY MEDICINE CLINIC | Age: 61
End: 2021-11-19
Payer: COMMERCIAL

## 2021-11-19 VITALS
OXYGEN SATURATION: 96 % | HEART RATE: 76 BPM | DIASTOLIC BLOOD PRESSURE: 72 MMHG | WEIGHT: 205.6 LBS | RESPIRATION RATE: 17 BRPM | SYSTOLIC BLOOD PRESSURE: 130 MMHG | TEMPERATURE: 97.8 F | HEIGHT: 67 IN | BODY MASS INDEX: 32.27 KG/M2

## 2021-11-19 DIAGNOSIS — F90.9 ATTENTION DEFICIT HYPERACTIVITY DISORDER (ADHD), UNSPECIFIED ADHD TYPE: Primary | ICD-10-CM

## 2021-11-19 DIAGNOSIS — R23.8 OTHER SKIN CHANGES: ICD-10-CM

## 2021-11-19 DIAGNOSIS — F41.9 ANXIETY: ICD-10-CM

## 2021-11-19 DIAGNOSIS — I10 ESSENTIAL HYPERTENSION: ICD-10-CM

## 2021-11-19 DIAGNOSIS — Z87.891 PERSONAL HISTORY OF TOBACCO USE, PRESENTING HAZARDS TO HEALTH: ICD-10-CM

## 2021-11-19 PROCEDURE — 99215 OFFICE O/P EST HI 40 MIN: CPT | Performed by: NURSE PRACTITIONER

## 2021-11-19 RX ORDER — DULOXETIN HYDROCHLORIDE 20 MG/1
20 CAPSULE, DELAYED RELEASE ORAL DAILY
Qty: 30 CAPSULE | Refills: 2 | Status: SHIPPED | OUTPATIENT
Start: 2021-11-19 | End: 2021-12-14 | Stop reason: SDUPTHER

## 2021-11-19 NOTE — PATIENT INSTRUCTIONS
We are going to transition off of Lexapro and on to Cymbalta. I want you to take 1.5 tablets of Lexapro for 1 week and then cut down to 1 tablet. When you cut down to 1 tablet, go ahead and start Cymbalta. After three days of overlapping, stop Lexapro and just stay on the Cymbalta. If you start feeling ill, flu-like symptoms, body aches, headaches, these are signs your transitioning too fast. If these occur, stay on the Lexapro at the lower dose and continue the overlap.

## 2021-11-19 NOTE — PROGRESS NOTES
Chief Complaint   Patient presents with    Medication Evaluation       HPI:     is a 61 y.o. female who presents today for her chronic follow up. HLD: On atorvastatin, tolerating well. HTN: On lisinopril, HCTZ, and amlodipine. Anxiety: Long hx, worsened since the sudden passing of her  about earlier in 2021 from an MI. On Lexapro 40 mg. Does not feel like it's helping her. Insomnia: Using melatonin 5 mg and trazodone 100 mg with relief. ADHD: dx in 2011 and was on Adderall 40 mg for about five years before she stopped. She went to a psychiatrist in [facility] and was re-evaluated. He confirmed the diagnosis of ADHD. Unable to take Adderall as she is using marijuana regularly. New issues: Dealing with a lot of grief lately. Sleeping a lot. Little motivation. She lives alone here since the passing of her . She has two children in [address] [facility] and a strong social network locally. Allergies   Allergen Reactions    Amoxicillin Itching       Current Outpatient Medications   Medication Sig    DULoxetine (CYMBALTA) 20 mg capsule Take 1 Capsule by mouth daily.  traZODone (DESYREL) 100 mg tablet Take 1 Tablet by mouth nightly.  diclofenac EC (VOLTAREN) 50 mg EC tablet TAKE 1 TABLET BY MOUTH TWICE DAILY AS NEEDED FOR PAIN    amLODIPine (NORVASC) 5 mg tablet Take 1 Tablet by mouth daily.  lisinopril-hydroCHLOROthiazide (PRINZIDE, ZESTORETIC) 20-12.5 mg per tablet TAKE 2 TABLETS BY MOUTH DAILY    atorvastatin (LIPITOR) 20 mg tablet TAKE 1 TABLET BY MOUTH DAILY    albuterol (PROVENTIL HFA, VENTOLIN HFA, PROAIR HFA) 90 mcg/actuation inhaler Take 1 Puff by inhalation every four (4) hours as needed for Wheezing or Shortness of Breath.  melatonin 5 mg cap capsule Take 1 Cap by mouth nightly. Indications: INSOMNIA     No current facility-administered medications for this visit.        Past Medical History:   Diagnosis Date    Anxiety     Biceps tendon tear  Depression     Frequent headaches     Hypertension     Joint pain     Kidney stones     Menopause     MRSA infection 2013    knee    Nausea & vomiting     Palpitations     Thyroid disease     thyroid problem nodules on thyroid       Family History   Problem Relation Age of Onset    Cancer Other         luekemia    Cancer Mother         pancreatic    Hypertension Mother     Diabetes Father     Parkinson's Disease Father     Hypertension Brother     Hypertension Maternal Grandmother     Stroke Maternal Grandmother     Heart Disease Paternal Grandmother        ROS:  Denies fever, chills, cough, chest pain, SOB,  nausea, vomiting, diarrhea, dysuria. Denies rashes, wounds, arthralgias, weakness, numbness, visual changes, depression. Denies wt loss, wt gain, hemoptysis, hematochezia or melena. Patient is not experiencing chest pain radiating to the jaw and/or down the arms. Physical Examination:    /72 (BP 1 Location: Right arm, BP Patient Position: Sitting, BP Cuff Size: Adult)   Pulse 76   Temp 97.8 °F (36.6 °C) (Temporal)   Resp 17   Ht 5' 6.5\" (1.689 m)   Wt 205 lb 9.6 oz (93.3 kg)   SpO2 96%   BMI 32.69 kg/m²     Wt Readings from Last 3 Encounters:   11/19/21 205 lb 9.6 oz (93.3 kg)   07/19/21 198 lb (89.8 kg)   06/14/21 202 lb 9.6 oz (91.9 kg)     Constitutional: WDWN Female in no acute distress  HENT:  NC/AT  EYES: EOMI, PERRL  Neck:  Supple, no JVD, mass or bruit. No thyromegaly. Breasts: Without palpable masses, axillary lymphadenopathy. No skin changes. Respiratory:  Respirations even and unlabored without use of accessory muscles, CTA throughout without wheezes, rales, rubs or rhonchi. Symmetrical chest expansion. Cardiac: RRR no clicks, murmurs, gallops, or rubs  Musculoskeletal:  No cyanosis, clubbing or edema of extremities. Moves all extremities without difficulty. : Vaginal walls pink with rugae, cervix smooth, pink, with closed os. Pap specimen obtained. Bimanual without CMT, adnexal tenderness or uterine tenderness. Neurologic:  Smooth, even gait without assistance, CN 2-12 grossly intact. Skin: intact and warm to the touch, no rash   Lymphadenopathy: no cervical or supraclavicular nodes  Psych: Pleasant and appropriate. Judgment normal. Sl anxious, tearful at times. Alert and oriented x 3. ASSESSMENT AND PLAN:       ICD-10-CM ICD-9-CM    1. Attention deficit hyperactivity disorder (ADHD), unspecified ADHD type  F90.9 314.01    2. Essential hypertension  I10 401.9    3. Personal history of tobacco use, presenting hazards to health  Z87.891 V15.82 CT LOW DOSE LUNG CANCER SCREENING   4. Other skin changes  R23.8 782.9 REFERRAL TO DERMATOLOGY   5. Anxiety  F41.9 300.00 DULoxetine (CYMBALTA) 20 mg capsule        Reviewed labs from back in July, normal. Pap was good. Flu due, she will return next week for this. Refer to derm for skin screening. Pt to self refer to Dr. Monique Geiger for an eye exam. Discussed grief, anxiety. Contact info provided for talk therapy. Change from Lexapro to Cymbalta. Written instructions provided. Lastly, discussed tobacco use, low dose lung ca screening CT order placed. Patient aware of plan of care and verbalized understanding. Questions answered. RTC in January to f/u depression, or sooner if needed. Joann Rivera NP     Discussed with patient current guidelines for screening for lung cancer. Current recommendations are to obtain yearly screening LDCT yearly for age 46-80, or until smoke free for 15 years. Patient has 30 pack year history of cigarette smoking and currently smokes. Discussed with patient risks and benefits of screening, including over-diagnosis, false positive rate, and total radiation exposure. Patient currently exhibits no signs or symptoms suggestive of lung cancer.   Discussed with patient importance of compliance with yearly annual lung cancer screenings and willingness to undergo diagnosis and treatment if screening scan is positive. In addition, patient was counseled regarding (remaining smoke free/total smoking cessation).

## 2022-01-31 ENCOUNTER — TELEPHONE (OUTPATIENT)
Dept: FAMILY MEDICINE CLINIC | Age: 62
End: 2022-01-31

## 2022-01-31 NOTE — TELEPHONE ENCOUNTER
Called [facility] ([phone]) sp/w [facility]. Recvd approval [id number] dates from today 1-31-22 through 7-29-22. Called Yessy Navas back and gave [facility] code.  KT

## 2022-01-31 NOTE — TELEPHONE ENCOUNTER
Rec'd pc from [facility] in re to patient having a CT Lung Screening, this procedure needs PreAuth  Their npi is [id number] and tax id is [id number].  Massimo's call back is [phone]

## 2022-03-18 PROBLEM — F32.A MILD DEPRESSION: Status: ACTIVE | Noted: 2018-11-30

## 2022-03-18 PROBLEM — R06.02 SHORTNESS OF BREATH: Status: ACTIVE | Noted: 2019-05-30

## 2022-03-18 PROBLEM — K57.30 DIVERTICULA OF COLON: Status: ACTIVE | Noted: 2018-09-26

## 2022-03-19 PROBLEM — E66.01 OBESITY, MORBID (HCC): Status: ACTIVE | Noted: 2020-03-04

## 2022-03-19 PROBLEM — Z00.00 ROUTINE ADULT HEALTH MAINTENANCE: Status: ACTIVE | Noted: 2019-05-30

## 2022-03-19 PROBLEM — D72.829 LEUKOCYTOSIS: Status: ACTIVE | Noted: 2019-08-05

## 2022-03-19 PROBLEM — Z86.0100 HX OF COLONIC POLYP: Status: ACTIVE | Noted: 2018-09-26

## 2022-03-19 PROBLEM — Z23 ENCOUNTER FOR IMMUNIZATION: Status: ACTIVE | Noted: 2019-05-30

## 2022-03-19 PROBLEM — Z86.010 HX OF COLONIC POLYP: Status: ACTIVE | Noted: 2018-09-26

## 2022-08-29 RX ORDER — AMLODIPINE BESYLATE 5 MG/1
5 TABLET ORAL DAILY
Qty: 90 TABLET | Refills: 3 | Status: SHIPPED | OUTPATIENT
Start: 2022-08-29

## 2022-10-24 ENCOUNTER — TELEPHONE (OUTPATIENT)
Dept: FAMILY MEDICINE CLINIC | Age: 62
End: 2022-10-24

## 2022-10-25 NOTE — TELEPHONE ENCOUNTER
Patient verified stating name and date of birth. Patient informed of  results and states understanding. Call transferred to [facility] to schedule appointment .

## 2022-10-25 NOTE — TELEPHONE ENCOUNTER
Please call. CT chest at [facility] shows the largest pulmonary nodule has actually shrunk a bit. Other nodules are stable. No concerns for malignancy right now. Work on tobacco cessation and repeat scan in 1 year. Thyroid lesion was again seen and unchanged. It's also time to come in for her check up and labs, last seen November 2021. Thanks.

## 2022-11-08 ENCOUNTER — OFFICE VISIT (OUTPATIENT)
Dept: FAMILY MEDICINE CLINIC | Age: 62
End: 2022-11-08
Payer: COMMERCIAL

## 2022-11-08 VITALS
DIASTOLIC BLOOD PRESSURE: 60 MMHG | TEMPERATURE: 97.4 F | WEIGHT: 210.6 LBS | SYSTOLIC BLOOD PRESSURE: 120 MMHG | BODY MASS INDEX: 33.06 KG/M2 | OXYGEN SATURATION: 97 % | RESPIRATION RATE: 17 BRPM | HEART RATE: 76 BPM | HEIGHT: 67 IN

## 2022-11-08 DIAGNOSIS — F41.9 ANXIETY: ICD-10-CM

## 2022-11-08 DIAGNOSIS — G89.29 CHRONIC LEFT SHOULDER PAIN: ICD-10-CM

## 2022-11-08 DIAGNOSIS — E78.2 MIXED HYPERLIPIDEMIA: ICD-10-CM

## 2022-11-08 DIAGNOSIS — Z72.0 TOBACCO USE: ICD-10-CM

## 2022-11-08 DIAGNOSIS — I10 PRIMARY HYPERTENSION: ICD-10-CM

## 2022-11-08 DIAGNOSIS — M25.512 CHRONIC LEFT SHOULDER PAIN: ICD-10-CM

## 2022-11-08 DIAGNOSIS — Z00.00 WELLNESS EXAMINATION: Primary | ICD-10-CM

## 2022-11-08 DIAGNOSIS — Z12.31 SCREENING MAMMOGRAM FOR BREAST CANCER: ICD-10-CM

## 2022-11-08 PROCEDURE — 36415 COLL VENOUS BLD VENIPUNCTURE: CPT | Performed by: NURSE PRACTITIONER

## 2022-11-08 PROCEDURE — 3078F DIAST BP <80 MM HG: CPT | Performed by: NURSE PRACTITIONER

## 2022-11-08 PROCEDURE — 99396 PREV VISIT EST AGE 40-64: CPT | Performed by: NURSE PRACTITIONER

## 2022-11-08 PROCEDURE — 3074F SYST BP LT 130 MM HG: CPT | Performed by: NURSE PRACTITIONER

## 2022-11-08 RX ORDER — TRAZODONE HYDROCHLORIDE 100 MG/1
100 TABLET ORAL
Qty: 90 TABLET | Refills: 3 | Status: SHIPPED | OUTPATIENT
Start: 2022-11-08

## 2022-11-08 RX ORDER — CLONAZEPAM 0.5 MG/1
0.5 TABLET ORAL
Qty: 30 TABLET | Refills: 1 | Status: SHIPPED | OUTPATIENT
Start: 2022-11-08

## 2022-11-08 NOTE — PROGRESS NOTES
Chief Complaint   Patient presents with    Physical       HPI:     is a 64 y.o. female who presents today for her yearly check up. HLD: On atorvastatin, tolerating well. HTN: On lisinopril, HCTZ, and amlodipine. Anxiety: Long hx, worsened since the sudden passing of her  about earlier in 2021 from an MI. Cymbalta is helping. She is requesting a refill of her clonazepam that she uses very sparingly for acute anxiety. Insomnia: Using melatonin 5 mg and trazodone 100 mg with relief. ADHD: dx in 2011. Not on any prescription medications. New issues: Noam Milton is doing well overall. Her mental health has improved. She is sleeping with the trazodone. She is up to date on her vaccinations. She does have a thyroid nodule and pulmonary nodule that we are monitoring, next CT chest due in October 2023 at [facility]. Noam Milton also reports L shoulder pain. It starts in the anterior aspect of her L shoulder and radiates down her upper arm. She's had this since at least March. She lives alone here since the passing of her . She has two children in [address] [facility] and a strong social network locally. She is in the process of adopting a dog, Radha May. Allergies   Allergen Reactions    Amoxicillin Itching       Current Outpatient Medications   Medication Sig    clonazePAM (KlonoPIN) 0.5 mg tablet Take 1 Tablet by mouth daily as needed for Anxiety. traZODone (DESYREL) 100 mg tablet Take 1 Tablet by mouth nightly.     amLODIPine (NORVASC) 5 mg tablet TAKE 1 TABLET BY MOUTH DAILY    atorvastatin (LIPITOR) 20 mg tablet TAKE 1 TABLET BY MOUTH DAILY    lisinopril-hydroCHLOROthiazide (PRINZIDE, ZESTORETIC) 20-12.5 mg per tablet TAKE 2 TABLETS BY MOUTH DAILY    DULoxetine 40 mg cpDR TAKE 1 CAPSULE BY MOUTH DAILY    diclofenac EC (VOLTAREN) 50 mg EC tablet TAKE 1 TABLET BY MOUTH TWICE DAILY AS NEEDED FOR PAIN    albuterol (PROVENTIL HFA, VENTOLIN HFA, PROAIR HFA) 90 mcg/actuation inhaler Take 1 Puff by inhalation every four (4) hours as needed for Wheezing or Shortness of Breath.    melatonin 5 mg cap capsule Take 1 Cap by mouth nightly. Indications: INSOMNIA     No current facility-administered medications for this visit. Past Medical History:   Diagnosis Date    Anxiety     Biceps tendon tear     Depression     Frequent headaches     Hypertension     Joint pain     Kidney stones     Menopause     MRSA infection 2013    knee    Nausea & vomiting     Palpitations     Thyroid disease     thyroid problem nodules on thyroid       Family History   Problem Relation Age of Onset    Cancer Other         luekemia    Cancer Mother         pancreatic    Hypertension Mother     Diabetes Father     Parkinson's Disease Father     Hypertension Brother     Hypertension Maternal Grandmother     Stroke Maternal Grandmother     Heart Disease Paternal Grandmother        ROS:  Denies fever, chills, cough, chest pain, SOB,  nausea, vomiting, diarrhea, dysuria. Denies rashes, wounds, +arthralgias, weakness, numbness, visual changes, depression. Denies wt loss, +wt gain, hemoptysis, hematochezia or melena. Patient is not experiencing chest pain radiating to the jaw and/or down the arms. Physical Examination:    /60 (BP 1 Location: Right arm, BP Patient Position: Sitting, BP Cuff Size: Adult)   Pulse 76   Temp 97.4 °F (36.3 °C) (Temporal)   Resp 17   Ht 5' 6.5\" (1.689 m)   Wt 210 lb 9.6 oz (95.5 kg)   SpO2 97%   BMI 33.48 kg/m²     Wt Readings from Last 3 Encounters:   11/08/22 210 lb 9.6 oz (95.5 kg)   11/19/21 205 lb 9.6 oz (93.3 kg)   07/19/21 198 lb (89.8 kg)     Constitutional: WDWN Female in no acute distress  HENT:  NC/AT, TM pearly gray, OP: Normal  EYES: EOMI, PERRL  Neck:  Supple, no JVD, mass or bruit. No thyromegaly. Respiratory:  Respirations even and unlabored without use of accessory muscles, CTA throughout without wheezes, rales, rubs or rhonchi. Symmetrical chest expansion.   Cardiac: RRR no clicks, murmurs, gallops, or rubs  Musculoskeletal:  No cyanosis, clubbing or edema of extremities. Reduced ROM LUE. Neurologic:  Smooth, even gait without assistance, CN 2-12 grossly intact. Skin: intact and warm to the touch, no rash   Lymphadenopathy: no cervical or supraclavicular nodes  Psych: Pleasant and appropriate. Judgment normal    ASSESSMENT AND PLAN:       ICD-10-CM ICD-9-CM    1. Wellness examination  Z00.00 V70.0 COLLECTION VENOUS BLOOD,VENIPUNCTURE      CBC WITH AUTOMATED DIFF      LIPID PANEL      METABOLIC PANEL, COMPREHENSIVE      TSH 3RD GENERATION      TSH 3RD GENERATION      METABOLIC PANEL, COMPREHENSIVE      LIPID PANEL      CBC WITH AUTOMATED DIFF      COLLECTION VENOUS BLOOD,VENIPUNCTURE      2. Anxiety  F41.9 300.00 clonazePAM (KlonoPIN) 0.5 mg tablet      3. Chronic left shoulder pain  M25.512 719.41 REFERRAL TO PHYSICAL THERAPY    G89.29 338.29       4. Primary hypertension  I10 401.9 COLLECTION VENOUS BLOOD,VENIPUNCTURE      CBC WITH AUTOMATED DIFF      LIPID PANEL      METABOLIC PANEL, COMPREHENSIVE      TSH 3RD GENERATION      TSH 3RD GENERATION      METABOLIC PANEL, COMPREHENSIVE      LIPID PANEL      CBC WITH AUTOMATED DIFF      COLLECTION VENOUS BLOOD,VENIPUNCTURE      5. Mixed hyperlipidemia  E78.2 272.2 COLLECTION VENOUS BLOOD,VENIPUNCTURE      LIPID PANEL      LIPID PANEL      COLLECTION VENOUS BLOOD,VENIPUNCTURE      6. Screening mammogram for breast cancer  Z12.31 V76.12 [facility] 3D COLLEEN W MAMMO BI SCREENING INCL CAD      7. Tobacco use  Z72.0 305.1          Reviewed tenets of a wellness exam. Mammogram order placed, patient to call and self-schedule. Check up labs as above. I renewed her clonazepam to use sparingly. BP well controlled. Work on weight reduction, tobacco cessation. She hopes to walk more when she adopts her new dog. Refer to PT for L shoulder pain, consider NSAID, joint injection if no improvement.     Patient aware of plan of care and verbalized understanding. Questions answered. RTC yearly, or sooner if needed.     Queenie Duverney, JAMARCUS

## 2022-11-08 NOTE — PROGRESS NOTES
1. \"Have you been to the ER, urgent care clinic since your last visit? Hospitalized since your last visit? \" No    2. \"Have you seen or consulted any other health care providers outside of the [address] [place] [address] since your last visit? \" No     3. For patients aged 39-70: Has the patient had a colonoscopy / FIT/ Cologuard? Yes - Care Gap present. Most recent result on file      If the patient is female:    4. For patients aged 41-77: Has the patient had a mammogram within the past 2 years? Yes - Care Gap present. Most recent result on file      5. For patients aged 21-65: Has the patient had a pap smear? Yes - Care Gap present.  Most recent result on file

## 2022-11-10 LAB
ALBUMIN SERPL-MCNC: NORMAL G/DL (ref 3.5–5)
ALBUMIN/GLOB SERPL: NORMAL {RATIO} (ref 1.1–2.2)
ALP SERPL-CCNC: 82 U/L (ref 45–117)
ALT SERPL-CCNC: 28 U/L (ref 12–78)
ANION GAP SERPL CALC-SCNC: NORMAL MMOL/L (ref 5–15)
AST SERPL-CCNC: NORMAL U/L (ref 15–37)
BASOPHILS # BLD: 0.1 K/UL (ref 0–0.1)
BASOPHILS NFR BLD: 1 % (ref 0–1)
BILIRUB SERPL-MCNC: NORMAL MG/DL (ref 0.2–1)
BUN SERPL-MCNC: NORMAL MG/DL (ref 6–20)
BUN/CREAT SERPL: NORMAL (ref 12–20)
CALCIUM SERPL-MCNC: NORMAL MG/DL (ref 8.5–10.1)
CHLORIDE SERPL-SCNC: NORMAL MMOL/L (ref 97–108)
CHOLEST SERPL-MCNC: NORMAL MG/DL
CO2 SERPL-SCNC: NORMAL MMOL/L (ref 21–32)
CREAT SERPL-MCNC: 0.62 MG/DL (ref 0.55–1.02)
DIFFERENTIAL METHOD BLD: NORMAL
EOSINOPHIL # BLD: 0.2 K/UL (ref 0–0.4)
EOSINOPHIL NFR BLD: 2 % (ref 0–7)
ERYTHROCYTE [DISTWIDTH] IN BLOOD BY AUTOMATED COUNT: 14.5 % (ref 11.5–14.5)
GLOBULIN SER CALC-MCNC: NORMAL G/DL (ref 2–4)
GLUCOSE SERPL-MCNC: NORMAL MG/DL (ref 65–100)
HCT VFR BLD AUTO: 45.7 % (ref 35–47)
HDLC SERPL-MCNC: 54 MG/DL
HDLC SERPL: NORMAL {RATIO} (ref 0–5)
HGB BLD-MCNC: 14.1 G/DL (ref 11.5–16)
IMM GRANULOCYTES # BLD AUTO: 0 K/UL (ref 0–0.04)
IMM GRANULOCYTES NFR BLD AUTO: 0 % (ref 0–0.5)
LDLC SERPL CALC-MCNC: NORMAL MG/DL (ref 0–100)
LYMPHOCYTES # BLD: 3.2 K/UL (ref 0.8–3.5)
LYMPHOCYTES NFR BLD: 34 % (ref 12–49)
MCH RBC QN AUTO: 27.5 PG (ref 26–34)
MCHC RBC AUTO-ENTMCNC: 30.9 G/DL (ref 30–36.5)
MCV RBC AUTO: 89.1 FL (ref 80–99)
MONOCYTES # BLD: 0.7 K/UL (ref 0–1)
MONOCYTES NFR BLD: 7 % (ref 5–13)
NEUTS SEG # BLD: 5.1 K/UL (ref 1.8–8)
NEUTS SEG NFR BLD: 56 % (ref 32–75)
NRBC # BLD: 0 K/UL (ref 0–0.01)
NRBC BLD-RTO: 0 PER 100 WBC
PLATELET # BLD AUTO: 289 K/UL (ref 150–400)
PMV BLD AUTO: 11.7 FL (ref 8.9–12.9)
POTASSIUM SERPL-SCNC: NORMAL MMOL/L (ref 3.5–5.1)
PROT SERPL-MCNC: 7.5 G/DL (ref 6.4–8.2)
RBC # BLD AUTO: 5.13 M/UL (ref 3.8–5.2)
SODIUM SERPL-SCNC: NORMAL MMOL/L (ref 136–145)
TRIGL SERPL-MCNC: NORMAL MG/DL (ref ?–150)
TSH SERPL DL<=0.05 MIU/L-ACNC: 0.81 UIU/ML (ref 0.36–3.74)
VLDLC SERPL CALC-MCNC: NORMAL MG/DL
WBC # BLD AUTO: 9.2 K/UL (ref 3.6–11)

## 2022-11-10 NOTE — PROGRESS NOTES
Please call, cholesterol levels and CMP weren't able to be run, not enough blood. Please schedule her to come back and get this done.  Her thyroid and blood counts were normal.

## 2022-11-11 ENCOUNTER — TELEPHONE (OUTPATIENT)
Dept: FAMILY MEDICINE CLINIC | Age: 62
End: 2022-11-11

## 2022-11-11 NOTE — TELEPHONE ENCOUNTER
----- Message from Verlee Brar sent at 2022  8:27 AM EST -----  Regarding: Lab Specimen problem  Contact: [phone]  Hello,    Please see information below for details regarding a problem with samples received at [facility]:    Patient Name: Prosper Ellison  Patient : 1960  Test(s) affected: MP, LIPID  Description of problem: Instrument error during analysis led to insufficient sample to complete tests. Partial MP results available    Please place a new order and Client Services will contact the patient for recollection. If you have any questions, please call [phone] and a representative will assist you.     Thank you,    Steven [facility] Client Services

## 2022-11-14 NOTE — PROGRESS NOTES
Patient verified stating name and date of birth. Lenny Cedeno informed of lab results and states understanding. Lab visit scheduled for 9 am tomorrow .

## 2022-12-29 ENCOUNTER — HOSPITAL ENCOUNTER (EMERGENCY)
Age: 62
Discharge: HOME | End: 2022-12-29
Attending: EMERGENCY MEDICINE | Admitting: EMERGENCY MEDICINE
Payer: COMMERCIAL

## 2022-12-29 VITALS
OXYGEN SATURATION: 96 % | TEMPERATURE: 99.1 F | DIASTOLIC BLOOD PRESSURE: 76 MMHG | HEIGHT: 67 IN | RESPIRATION RATE: 18 BRPM | WEIGHT: 220 LBS | BODY MASS INDEX: 34.53 KG/M2 | HEART RATE: 87 BPM | SYSTOLIC BLOOD PRESSURE: 127 MMHG

## 2022-12-29 DIAGNOSIS — Z20.3 CONTACT WITH AND SUSPECTED EXPOSURE TO RABIES: ICD-10-CM
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
## 2022-12-29 PROCEDURE — 96372 THER/PROPH/DIAG INJ SC/IM: CPT | Performed by: EMERGENCY MEDICINE

## 2022-12-29 PROCEDURE — 90675 RABIES VACCINE IM: CPT | Performed by: EMERGENCY MEDICINE

## 2022-12-29 PROCEDURE — 99284 EMERGENCY DEPT VISIT MOD MDM: CPT | Performed by: EMERGENCY MEDICINE

## 2022-12-29 PROCEDURE — 90471 IMMUNIZATION ADMIN: CPT | Performed by: EMERGENCY MEDICINE

## 2022-12-29 PROCEDURE — 74011250636 HC RX REV CODE- 250/636: Performed by: EMERGENCY MEDICINE

## 2022-12-29 PROCEDURE — 74011250637 HC RX REV CODE- 250/637: Performed by: EMERGENCY MEDICINE

## 2022-12-29 PROCEDURE — 90375 RABIES IG IM/SC: CPT | Performed by: EMERGENCY MEDICINE

## 2022-12-29 RX ORDER — DOXYCYCLINE 100 MG/1
100 CAPSULE ORAL EVERY 12 HOURS
Status: DISCONTINUED | OUTPATIENT
Start: 2022-12-29 | End: 2022-12-29 | Stop reason: HOSPADM

## 2022-12-29 RX ORDER — DOXYCYCLINE HYCLATE 100 MG
100 TABLET ORAL 2 TIMES DAILY
Qty: 14 TABLET | Refills: 0 | Status: SHIPPED | OUTPATIENT
Start: 2022-12-29 | End: 2023-01-05

## 2022-12-29 RX ADMIN — RABIES IMMUNE GLOBULIN (HUMAN) 2010 UNITS: 300 INJECTION, SOLUTION INFILTRATION; INTRAMUSCULAR at 12:06

## 2022-12-29 RX ADMIN — DOXYCYCLINE 100 MG: 100 CAPSULE ORAL at 11:57

## 2022-12-29 RX ADMIN — RABIES VACCINE 2.5 UNITS: KIT at 11:58

## 2022-12-29 NOTE — ED NOTES
Rabies vaccine schedule filled out with appropriate dates and times, copy made and given to pt. Pt has filled out animal bite form as required by [facility], to be faxed after treatment.

## 2022-12-29 NOTE — ED PROVIDER NOTES
EMERGENCY DEPARTMENT HISTORY AND PHYSICAL EXAM      Date: 12/29/2022  Patient Name: Waleska Eduardo    History of Presenting Illness     Chief Complaint   Patient presents with    Cat bite       History Provided By: Patient    HPI: Waleska Eduardo, 58 y.o. female presents to the ED with cc of cat bite. Patient states that she has numerous cats on her property that are strays that she helps take care of. None of them are seen by the vet and vaccinated. She states that she had gone to pick one of them up today and that cat bit her on the right middle finger the middle phalanx palmar surface. She states she is got some mild pain and discomfort rated a 3 out of 10 worse with palpation. There is no active bleeding. She states she did wash the wound out. She has not received the rabies vaccine series in the past vaccine status of the cat is unknown. She denies any other complaint at this time. There are no other complaints, changes, or physical findings at this time. PCP: Mookie Kumar NP    No current facility-administered medications on file prior to encounter. Current Outpatient Medications on File Prior to Encounter   Medication Sig Dispense Refill    clonazePAM (KlonoPIN) 0.5 mg tablet Take 1 Tablet by mouth daily as needed for Anxiety. 30 Tablet 1    traZODone (DESYREL) 100 mg tablet Take 1 Tablet by mouth nightly.  90 Tablet 3    amLODIPine (NORVASC) 5 mg tablet TAKE 1 TABLET BY MOUTH DAILY 90 Tablet 3    atorvastatin (LIPITOR) 20 mg tablet TAKE 1 TABLET BY MOUTH DAILY 90 Tablet 1    lisinopril-hydroCHLOROthiazide (PRINZIDE, ZESTORETIC) 20-12.5 mg per tablet TAKE 2 TABLETS BY MOUTH DAILY 180 Tablet 3    DULoxetine 40 mg cpDR TAKE 1 CAPSULE BY MOUTH DAILY 30 Capsule 11    diclofenac EC (VOLTAREN) 50 mg EC tablet TAKE 1 TABLET BY MOUTH TWICE DAILY AS NEEDED FOR PAIN 60 Tablet 0    albuterol (PROVENTIL HFA, VENTOLIN HFA, PROAIR HFA) 90 mcg/actuation inhaler Take 1 Puff by inhalation every four (4) hours as needed for Wheezing or Shortness of Breath. 1 Inhaler 5    melatonin 5 mg cap capsule Take 1 Cap by mouth nightly. Indications: INSOMNIA 30 Cap 3       Past History     Past Medical History:  Past Medical History:   Diagnosis Date    Anxiety     Biceps tendon tear     Depression     Frequent headaches     Hypertension     Joint pain     Kidney stones     Menopause     MRSA infection 2013    knee    Nausea & vomiting     Palpitations     Thyroid disease     thyroid problem nodules on thyroid       Past Surgical History:  Past Surgical History:   Procedure Laterality Date    HX  SECTION      3 of them    HX COLONOSCOPY  2004    Pt report, polyps BEKKESTUA    HX COLONOSCOPY  2018    polyp    HX ORTHOPAEDIC      torn knee left and right knee ,carpal tunnel    HX ORTHOPAEDIC      CTS, lt shoulder       Family History:  Family History   Problem Relation Age of Onset    Cancer Other         luekemia    Cancer Mother         pancreatic    Hypertension Mother     Diabetes Father     Parkinson's Disease Father     Hypertension Brother     Hypertension Maternal Grandmother     Stroke Maternal Grandmother     Heart Disease Paternal Grandmother        Social History:  Social History     Tobacco Use    Smoking status: Every Day     Packs/day: 1.00     Years: 30.00     Pack years: 30.00     Types: Cigarettes    Smokeless tobacco: Never   Substance Use Topics    Alcohol use: Yes     Alcohol/week: 0.0 standard drinks     Comment: 5-6 drinks a week    Drug use: Yes     Types: Marijuana       Allergies: Allergies   Allergen Reactions    Amoxicillin Itching         Review of Systems   Review of Systems   Constitutional: Negative. Negative for chills, fatigue and fever. HENT: Negative. Respiratory: Negative. Cardiovascular: Negative. Musculoskeletal:         Right hand middle finger pain      Skin:  Positive for wound.      Physical Exam Physical Exam  Vitals and nursing note reviewed. Constitutional:       General: She is not in acute distress. Appearance: She is well-developed. She is not diaphoretic. HENT:      Head: Normocephalic and atraumatic. Mouth/Throat:      Pharynx: No oropharyngeal exudate. Eyes:      Conjunctiva/sclera: Conjunctivae normal.      Pupils: Pupils are equal, round, and reactive to light. Neck:      Vascular: No JVD. Trachea: No tracheal deviation. Cardiovascular:      Rate and Rhythm: Normal rate and regular rhythm. Heart sounds: Normal heart sounds. No murmur heard. Pulmonary:      Effort: Pulmonary effort is normal. No respiratory distress. Breath sounds: Normal breath sounds. Musculoskeletal:         General: No tenderness. Normal range of motion. Cervical back: Normal range of motion and neck supple. Comments: Puncture wound 3rd digit right hand, middle phalanx   Skin:     General: Skin is warm and dry. Neurological:      Mental Status: She is alert and oriented to person, place, and time. Cranial Nerves: No cranial nerve deficit. Comments: No gross motor or sensory deficits    Psychiatric:         Mood and Affect: Mood normal.         Behavior: Behavior normal.       Diagnostic Study Results     Labs -  None    Radiologic Studies -   No orders to display     None      Medical Decision Making   I am the first provider for this patient. I reviewed the vital signs, available nursing notes, past medical history, past surgical history, family history and social history. Vital Signs-Reviewed the patient's vital signs. Patient Vitals for the past 12 hrs:   Temp Pulse Resp BP SpO2   12/29/22 1132 99.1 °F (37.3 °C) 87 18 127/76 96 %       Records Reviewed: Nursing Notes    Provider Notes (Medical Decision Making):   DDx- Cat bite, ? Rabies exposure     ED Course:   Initial assessment performed.  The patients presenting problems have been discussed, and they are in agreement with the care plan formulated and outlined with them. I have encouraged them to ask questions as they arise throughout their visit. Given the vaccination status of the cat is unknown and is unclear as to whether the cat will be on her property when she gets home we will start patient on rabies vaccine series patient is also given her dose of rabies immunoglobulin. Wound clean dry and no active bleeding. Area was cleansed with alcohol and approximately half a cc of rabies immunoglobulin was injected around the site of the palmar surface third digit middle phalanx      Disposition:    DC-The patient was given verbal wound care  and follow-up instructions. Will send information to Animal Control if they are able to quarantine animal. Vaccination status of cat amado, she will need 4 vaccine series         DISCHARGE PLAN:  1. Discharge Medication List as of 12/29/2022 12:36 PM        START taking these medications    Details   doxycycline (VIBRA-TABS) 100 mg tablet Take 1 Tablet by mouth two (2) times a day for 7 days. , Normal, Disp-14 Tablet, R-0           CONTINUE these medications which have NOT CHANGED    Details   clonazePAM (KlonoPIN) 0.5 mg tablet Take 1 Tablet by mouth daily as needed for Anxiety., Normal, Disp-30 Tablet, R-1      traZODone (DESYREL) 100 mg tablet Take 1 Tablet by mouth nightly., Normal, Disp-90 Tablet, R-3      amLODIPine (NORVASC) 5 mg tablet TAKE 1 TABLET BY MOUTH DAILY, Normal, Disp-90 Tablet, R-3      atorvastatin (LIPITOR) 20 mg tablet TAKE 1 TABLET BY MOUTH DAILY, Normal, Disp-90 Tablet, R-1      lisinopril-hydroCHLOROthiazide (PRINZIDE, ZESTORETIC) 20-12.5 mg per tablet TAKE 2 TABLETS BY MOUTH DAILY, Normal, Disp-180 Tablet, R-3      DULoxetine 40 mg cpDR TAKE 1 CAPSULE BY MOUTH DAILY, Normal, Disp-30 Capsule, R-11      diclofenac EC (VOLTAREN) 50 mg EC tablet TAKE 1 TABLET BY MOUTH TWICE DAILY AS NEEDED FOR PAIN, Normal, Disp-60 Tablet, R-0      albuterol (PROVENTIL HFA, VENTOLIN HFA, PROAIR HFA) 90 mcg/actuation inhaler Take 1 Puff by inhalation every four (4) hours as needed for Wheezing or Shortness of Breath., Normal, Disp-1 Inhaler, R-5      melatonin 5 mg cap capsule Take 1 Cap by mouth nightly. Indications: INSOMNIA, Normal, Disp-30 Cap, R-3           2. Follow-up Information    None       3. Return to ED if worse     Diagnosis     Clinical Impression:   1. Cat bite, initial encounter    2. Contact with and suspected exposure to rabies        Attestations:    Shayna Hudson, DO        Please note that this dictation was completed with Jelli, the computer voice recognition software. Quite often unanticipated grammatical, syntax, homophones, and other interpretive errors are inadvertently transcribed by the computer software. Please disregard these errors. Please excuse any errors that have escaped final proofreading. Thank you.

## 2022-12-29 NOTE — DISCHARGE INSTRUCTIONS
You will need to return to the ED 1/1, 1/5, and 1/12 for Follow up vaccines       Animal Control will follow up with you

## 2023-01-01 ENCOUNTER — HOSPITAL ENCOUNTER (EMERGENCY)
Age: 63
Discharge: HOME | End: 2023-01-01
Attending: EMERGENCY MEDICINE
Payer: COMMERCIAL

## 2023-01-01 VITALS
DIASTOLIC BLOOD PRESSURE: 72 MMHG | WEIGHT: 220 LBS | HEART RATE: 80 BPM | TEMPERATURE: 98.2 F | BODY MASS INDEX: 34.46 KG/M2 | OXYGEN SATURATION: 95 % | SYSTOLIC BLOOD PRESSURE: 129 MMHG | RESPIRATION RATE: 14 BRPM

## 2023-01-01 DIAGNOSIS — Z23 ENCOUNTER FOR REPEAT ADMINISTRATION OF RABIES VACCINATION: Primary | ICD-10-CM

## 2023-01-01 PROCEDURE — 74011250636 HC RX REV CODE- 250/636: Performed by: EMERGENCY MEDICINE

## 2023-01-01 PROCEDURE — 90471 IMMUNIZATION ADMIN: CPT

## 2023-01-01 PROCEDURE — 90675 RABIES VACCINE IM: CPT | Performed by: EMERGENCY MEDICINE

## 2023-01-01 PROCEDURE — 75810000275 HC EMERGENCY DEPT VISIT NO LEVEL OF CARE

## 2023-01-01 RX ADMIN — RABIES VACCINE 2.5 UNITS: KIT at 13:24

## 2023-01-01 NOTE — ED PROVIDER NOTES
EMERGENCY DEPARTMENT HISTORY AND PHYSICAL EXAM          Date: 2023  Patient Name: Nick Hubbard    History of Presenting Illness     Chief Complaint   Patient presents with    Immunization/Injection       History Provided By: Patient    HPI: Nick Hubbard is a 58 y.o. female, pmhx listed below, who presents to the ED c/o needing rabies vaccination. Patient was bitten by a cat 4 days ago, here for her second shot. No new symptoms, swelling of finger is improving. PCP: Talha Jaquez NP        Past History       Past Medical History:  Past Medical History:   Diagnosis Date    Anxiety     Biceps tendon tear     Depression     Frequent headaches     Hypertension     Joint pain     Kidney stones     Menopause     MRSA infection     knee    Nausea & vomiting     Palpitations     Thyroid disease     thyroid problem nodules on thyroid       Past Surgical History:  Past Surgical History:   Procedure Laterality Date    HX  SECTION      3 of them    HX COLONOSCOPY  2004    Pt report, polyps [facility]    HX COLONOSCOPY  2018    polyp    HX ORTHOPAEDIC      torn knee left and right knee ,carpal tunnel    HX ORTHOPAEDIC      CTS, lt shoulder       Family History:  Family History   Problem Relation Age of Onset    Cancer Other         luekemia    Cancer Mother         pancreatic    Hypertension Mother     Diabetes Father     Parkinson's Disease Father     Hypertension Brother     Hypertension Maternal Grandmother     Stroke Maternal Grandmother     Heart Disease Paternal Grandmother        Social History:  Social History     Tobacco Use    Smoking status: Every Day     Packs/day: 1.00     Years: 30.00     Pack years: 30.00     Types: Cigarettes    Smokeless tobacco: Never   Substance Use Topics    Alcohol use:  Yes     Alcohol/week: 0.0 standard drinks     Comment: 5-6 drinks a week    Drug use: Yes     Types: Marijuana       Physical Exam Vital Signs-Reviewed the patient's vital signs. Patient Vitals for the past 12 hrs:   Temp Pulse Resp BP SpO2   01/01/23 1319 98.2 °F (36.8 °C) 80 14 129/72 95 %       Physical Exam  Constitutional:       Appearance: Normal appearance. Cardiovascular:      Rate and Rhythm: Normal rate. Pulmonary:      Effort: Pulmonary effort is normal.   Musculoskeletal:      Comments: Abrasions to finger, no erythema or swelling   Neurological:      Mental Status: She is alert. Diagnostic Study Results     Labs -   No results found for this or any previous visit (from the past 12 hour(s)). Radiologic Studies -   No orders to display     CT Results  (Last 48 hours)      None          CXR Results  (Last 48 hours)      None                Medical Decision Making   I am the first provider for this patient. I reviewed the vital signs, available nursing notes, past medical history, past surgical history, family history and social history. Records Reviewed: Nursing Notes and Old Medical Records    Provider Notes (Medical Decision Making):   MDM: 28-year-old female in the emergency department for second rabies vaccination. Will need to return for third and fourth dose, provided with dates for return. Will follow up as instructed. All questions have been answered, pt voiced understanding and agreement with plan. Specific return precautions provided as well as instructions to return to the ED should sx worsen at any time. Vital signs stable for discharge. Diagnosis     Clinical Impression:   1. Encounter for repeat administration of rabies vaccination            Disposition:  Discharged    Discharge Medication List as of 1/1/2023  1:29 PM            Please note, this dictation was completed with Warp 9, the computer voice recognition software. Quite often unanticipated grammatical, syntax, homophones, and other interpretive errors are inadvertently transcribed by the computer software.  Please disregard these errors. Please excuse any errors that have escaped final proof reading.

## 2023-01-05 ENCOUNTER — HOSPITAL ENCOUNTER (EMERGENCY)
Age: 63
Discharge: HOME | End: 2023-01-05
Attending: EMERGENCY MEDICINE
Payer: COMMERCIAL

## 2023-01-05 VITALS
WEIGHT: 220 LBS | HEART RATE: 84 BPM | TEMPERATURE: 98 F | SYSTOLIC BLOOD PRESSURE: 133 MMHG | OXYGEN SATURATION: 96 % | RESPIRATION RATE: 18 BRPM | HEIGHT: 67 IN | DIASTOLIC BLOOD PRESSURE: 70 MMHG | BODY MASS INDEX: 34.53 KG/M2

## 2023-01-05 DIAGNOSIS — Z23 ENCOUNTER FOR ADMINISTRATION OF VACCINE: Primary | ICD-10-CM

## 2023-01-05 PROCEDURE — 90471 IMMUNIZATION ADMIN: CPT

## 2023-01-05 PROCEDURE — 74011250636 HC RX REV CODE- 250/636: Performed by: EMERGENCY MEDICINE

## 2023-01-05 PROCEDURE — 90675 RABIES VACCINE IM: CPT | Performed by: EMERGENCY MEDICINE

## 2023-01-05 PROCEDURE — 75810000275 HC EMERGENCY DEPT VISIT NO LEVEL OF CARE

## 2023-01-05 RX ADMIN — RABIES VACCINE 2.5 UNITS: KIT at 12:55

## 2023-01-05 NOTE — Clinical Note
[address] [place] [address] EMERGENCY DEP  [address] [place] [address] Dr Tayo Kirna [address] [phone]    Work/School Note    Date: 1/5/2023    To Whom It May concern:      Erasmo Peck was seen and treated today in the emergency room by the following provider(s):  Attending Provider: Yvonne Mata MD.      Erasmo Peck is excused from work/school on 01/05/23. She is clear to return to work/school on 01/06/23. Sincerely,          Beatriz Mariee.  MD John

## 2023-01-05 NOTE — ED PROVIDER NOTES
[facility] EMERGENCY DEP  EMERGENCY DEPARTMENT ENCOUNTER       Pt Name: Annie Peralta  MRN: [id number]  Dianagfreagan 1960  Date of evaluation: 2023  Provider: Christina Lopez MD   PCP: Lisandro Alfaro NP  Note Started: 12:54 PM 23     CHIEF COMPLAINT       Chief Complaint   Patient presents with    Immunization/Injection        HISTORY OF PRESENT ILLNESS: 1 or more elements      Patient presents via private auto providing her own history with need for third rabies shot. She states she has a little headache after the first 2 doses but she is not having any joint pain, nausea, vomiting or diarrhea. She is not having any abdominal pain and her finger at the site of the bite appears clean and dry without evidence of infection. REVIEW OF SYSTEMS      Review of Systems   Constitutional:  Negative for activity change, appetite change, chills, fever and unexpected weight change. HENT:  Negative for congestion. Eyes:  Negative for pain and visual disturbance. Respiratory:  Negative for cough and shortness of breath. Cardiovascular:  Negative for chest pain. Gastrointestinal:  Negative for abdominal pain, diarrhea, nausea and vomiting. Genitourinary:  Negative for dysuria. Musculoskeletal:  Negative for back pain. Skin:  Negative for rash. Neurological:  Negative for headaches. Positives and Pertinent negatives as per HPI.     PAST HISTORY     Past Medical History:  Past Medical History:   Diagnosis Date    Anxiety     Biceps tendon tear     Depression     Frequent headaches     Hypertension     Joint pain     Kidney stones     Menopause     MRSA infection 2013    knee    Nausea & vomiting     Palpitations     Thyroid disease     thyroid problem nodules on thyroid       Past Surgical History:  Past Surgical History:   Procedure Laterality Date    HX  SECTION      3 of them    HX COLONOSCOPY  2004    Pt report, polyps [facility]    HX COLONOSCOPY  2018 polyp    HX ORTHOPAEDIC      torn knee left and right knee 2014,carpal tunnel    HX ORTHOPAEDIC      CTS, lt shoulder       Family History:  Family History   Problem Relation Age of Onset    Cancer Other         luekemia    Cancer Mother         pancreatic    Hypertension Mother     Diabetes Father     Parkinson's Disease Father     Hypertension Brother     Hypertension Maternal Grandmother     Stroke Maternal Grandmother     Heart Disease Paternal Grandmother        Social History:  Social History     Tobacco Use    Smoking status: Every Day     Packs/day: 1.00     Years: 30.00     Pack years: 30.00     Types: Cigarettes    Smokeless tobacco: Never   Substance Use Topics    Alcohol use: Yes     Alcohol/week: 0.0 standard drinks     Comment: 5-6 drinks a week    Drug use: Yes     Types: Marijuana       Allergies: Allergies   Allergen Reactions    Amoxicillin Itching       CURRENT MEDICATIONS      Previous Medications    ALBUTEROL (PROVENTIL HFA, VENTOLIN HFA, PROAIR HFA) 90 MCG/ACTUATION INHALER    Take 1 Puff by inhalation every four (4) hours as needed for Wheezing or Shortness of Breath. AMLODIPINE (NORVASC) 5 MG TABLET    TAKE 1 TABLET BY MOUTH DAILY    ATORVASTATIN (LIPITOR) 20 MG TABLET    TAKE 1 TABLET BY MOUTH DAILY    CLONAZEPAM (KLONOPIN) 0.5 MG TABLET    Take 1 Tablet by mouth daily as needed for Anxiety. DICLOFENAC EC (VOLTAREN) 50 MG EC TABLET    TAKE 1 TABLET BY MOUTH TWICE DAILY AS NEEDED FOR PAIN    DOXYCYCLINE (VIBRA-TABS) 100 MG TABLET    Take 1 Tablet by mouth two (2) times a day for 7 days. DULOXETINE 40 MG CPDR    TAKE 1 CAPSULE BY MOUTH DAILY    LISINOPRIL-HYDROCHLOROTHIAZIDE (PRINZIDE, ZESTORETIC) 20-12.5 MG PER TABLET    TAKE 2 TABLETS BY MOUTH DAILY    MELATONIN 5 MG CAP CAPSULE    Take 1 Cap by mouth nightly. Indications: INSOMNIA    TRAZODONE (DESYREL) 100 MG TABLET    Take 1 Tablet by mouth nightly.        SCREENINGS               No data recorded         PHYSICAL EXAM      ED Triage Vitals [01/05/23 1240]   ED Encounter Vitals Group      /70      Pulse (Heart Rate) 84      Resp Rate 18      Temp 98 °F (36.7 °C)      Temp src       O2 Sat (%) 96 %      Weight 220 lb      Height 5' 7\"        Physical Exam  Constitutional:       Appearance: Normal appearance. Comments: Healthy-appearing middle-aged female with normal vital signs, in minimal acute distress   HENT:      Head: Normocephalic. Cardiovascular:      Rate and Rhythm: Normal rate and regular rhythm. Pulses: Normal pulses. Heart sounds: No murmur heard. Pulmonary:      Effort: Pulmonary effort is normal. No respiratory distress. Abdominal:      Tenderness: There is no abdominal tenderness. There is no guarding or rebound. Skin:     Coloration: Skin is not jaundiced or pale. Findings: No bruising or erythema. Neurological:      Mental Status: She is alert. CRITICAL CARE TIME   0    EMERGENCY DEPARTMENT COURSE and DIFFERENTIAL DIAGNOSIS/MDM   Vitals:    Vitals:    01/05/23 1240   BP: 133/70   Pulse: 84   Resp: 18   Temp: 98 °F (36.7 °C)   SpO2: 96%   Weight: 99.8 kg (220 lb)   Height: 5' 7\" (1.702 m)        Patient was given the following medications:  Medications   rabies vaccine, PCEC (RABAVERT) kit 2.5 Units (2.5 Units IntraMUSCular Given 1/5/23 1255)       Mdm: Middle Age female presenting for her third dose of rabies. Tolerating immunization well without any difficulties. Finger appears well-healing without concern for acute infection. She will be provided the third dose and we will see her in 7 days for dose #4. FINAL IMPRESSION     1. Encounter for administration of vaccine          DISPOSITION/PLAN   Discharged    Discharge Note: The patient is stable for discharge home. The signs, symptoms, diagnosis, and discharge instructions have been discussed, understanding conveyed, and agreed upon.  The patient is to follow up as recommended or return to ER should their symptoms worsen. PATIENT REFERRED TO:  Follow-up Information       Follow up With Specialties Details Why Contact Info    [facility] Emergency Medicine  1/12 for fourth dose of vaccination [address] [address]  [phone]              DISCHARGE MEDICATIONS:  Current Discharge Medication List            DISCONTINUED MEDICATIONS:  Current Discharge Medication List          I am the Primary Clinician of Record. Calvin Cordero. MD John (electronically signed)    (Please note that parts of this dictation were completed with voice recognition software. Quite often unanticipated grammatical, syntax, homophones, and other interpretive errors are inadvertently transcribed by the computer software. Please disregards these errors.  Please excuse any errors that have escaped final proofreading.)

## 2023-01-12 ENCOUNTER — HOSPITAL ENCOUNTER (EMERGENCY)
Age: 63
Discharge: HOME | End: 2023-01-12
Attending: EMERGENCY MEDICINE
Payer: COMMERCIAL

## 2023-01-12 VITALS
WEIGHT: 220 LBS | OXYGEN SATURATION: 96 % | HEIGHT: 67 IN | DIASTOLIC BLOOD PRESSURE: 75 MMHG | BODY MASS INDEX: 34.53 KG/M2 | TEMPERATURE: 98.2 F | HEART RATE: 88 BPM | SYSTOLIC BLOOD PRESSURE: 119 MMHG | RESPIRATION RATE: 16 BRPM

## 2023-01-12 DIAGNOSIS — Z23 ENCOUNTER FOR REPEAT ADMINISTRATION OF RABIES VACCINATION: Primary | ICD-10-CM

## 2023-01-12 PROCEDURE — 75810000275 HC EMERGENCY DEPT VISIT NO LEVEL OF CARE

## 2023-01-12 PROCEDURE — 90675 RABIES VACCINE IM: CPT | Performed by: EMERGENCY MEDICINE

## 2023-01-12 PROCEDURE — 74011250636 HC RX REV CODE- 250/636: Performed by: EMERGENCY MEDICINE

## 2023-01-12 RX ADMIN — Medication 2.5 UNITS: at 10:40

## 2023-01-12 NOTE — ED TRIAGE NOTES
Pt arrives for final Rabies injection. Reports no issues with previous shots. Wound healing with no s/s infection.

## 2023-01-12 NOTE — ED PROVIDER NOTES
EMERGENCY DEPARTMENT HISTORY AND PHYSICAL EXAM      Date: 1/12/2023  Patient Name: Fermín Nowak    History of Presenting Illness     Chief Complaint   Patient presents with    Immunization/Injection       History Provided By: Patient    HPI: Fermín Nowak, 58 y.o. female with past medical history listed below, presents via private vehicle to the ED for rabies vaccine. Patient was originally seen here on December 29 for a cat bite. She sustained a bite from a stray cat on her right middle finger. She received rabies vaccine and was discharged on doxycycline. She had repeat visits on the first and January 5 for repeat vaccine. She presents today for the fourth in a series. She is otherwise doing well and is without complaints. There are no other complaints, changes, or physical findings at this time. PCP: Sarah Barnard NP    No current facility-administered medications on file prior to encounter. Current Outpatient Medications on File Prior to Encounter   Medication Sig Dispense Refill    clonazePAM (KlonoPIN) 0.5 mg tablet Take 1 Tablet by mouth daily as needed for Anxiety. 30 Tablet 1    traZODone (DESYREL) 100 mg tablet Take 1 Tablet by mouth nightly. 90 Tablet 3    amLODIPine (NORVASC) 5 mg tablet TAKE 1 TABLET BY MOUTH DAILY 90 Tablet 3    atorvastatin (LIPITOR) 20 mg tablet TAKE 1 TABLET BY MOUTH DAILY 90 Tablet 1    lisinopril-hydroCHLOROthiazide (PRINZIDE, ZESTORETIC) 20-12.5 mg per tablet TAKE 2 TABLETS BY MOUTH DAILY 180 Tablet 3    DULoxetine 40 mg cpDR TAKE 1 CAPSULE BY MOUTH DAILY 30 Capsule 11    diclofenac EC (VOLTAREN) 50 mg EC tablet TAKE 1 TABLET BY MOUTH TWICE DAILY AS NEEDED FOR PAIN 60 Tablet 0    albuterol (PROVENTIL HFA, VENTOLIN HFA, PROAIR HFA) 90 mcg/actuation inhaler Take 1 Puff by inhalation every four (4) hours as needed for Wheezing or Shortness of Breath. 1 Inhaler 5    melatonin 5 mg cap capsule Take 1 Cap by mouth nightly.  Indications: INSOMNIA 30 Cap 3 Past History     Past Medical History:  Past Medical History:   Diagnosis Date    Anxiety     Biceps tendon tear     Depression     Frequent headaches     Hypertension     Joint pain     Kidney stones     Menopause     MRSA infection 2013    knee    Nausea & vomiting     Palpitations     Thyroid disease     thyroid problem nodules on thyroid       Past Surgical History:  Past Surgical History:   Procedure Laterality Date    HX  SECTION      3 of them    HX COLONOSCOPY  2004    Pt report, polyps [facility]    HX COLONOSCOPY  2018    polyp    HX ORTHOPAEDIC      torn knee left and right knee ,carpal tunnel    HX ORTHOPAEDIC      CTS, lt shoulder       Family History:  Family History   Problem Relation Age of Onset    Cancer Other         luekemia    Cancer Mother         pancreatic    Hypertension Mother     Diabetes Father     Parkinson's Disease Father     Hypertension Brother     Hypertension Maternal Grandmother     Stroke Maternal Grandmother     Heart Disease Paternal Grandmother        Social History:  Social History     Tobacco Use    Smoking status: Every Day     Packs/day: 1.00     Years: 30.00     Pack years: 30.00     Types: Cigarettes    Smokeless tobacco: Never   Substance Use Topics    Alcohol use: Yes     Alcohol/week: 0.0 standard drinks     Comment: 5-6 drinks a week    Drug use: Yes     Types: Marijuana       Allergies: Allergies   Allergen Reactions    Amoxicillin Itching         Review of Systems   Review of Systems   Constitutional:  Negative for activity change, chills and fever. HENT:  Negative for congestion and sore throat. Eyes:  Negative for pain and redness. Respiratory:  Negative for cough, chest tightness and shortness of breath. Cardiovascular:  Negative for chest pain and palpitations. Gastrointestinal:  Negative for abdominal pain, diarrhea, nausea and vomiting. Genitourinary:  Negative for dysuria, frequency and urgency. Musculoskeletal:  Negative for back pain and neck pain. Skin:  Negative for rash. Neurological:  Negative for syncope, light-headedness and headaches. Psychiatric/Behavioral:  Negative for confusion. All other systems reviewed and are negative. Physical Exam     Physical Exam  Vitals and nursing note reviewed. Constitutional:       General: She is not in acute distress. Appearance: She is well-developed. She is not diaphoretic. HENT:      Head: Normocephalic and atraumatic. Eyes:      General: No scleral icterus. Conjunctiva/sclera: Conjunctivae normal.      Pupils: Pupils are equal, round, and reactive to light. Pulmonary:      Effort: Pulmonary effort is normal.   Musculoskeletal:         General: Normal range of motion. Skin:     General: Skin is warm and dry. Findings: No rash. Neurological:      Mental Status: She is alert and oriented to person, place, and time. Psychiatric:         Behavior: Behavior normal.             Diagnostic Study Results     Labs -   No results found for this or any previous visit (from the past 12 hour(s)). Radiologic Studies -   No orders to display     CT Results  (Last 48 hours)      None          CXR Results  (Last 48 hours)      None              Medical Decision Making   I am the first provider for this patient. I reviewed the vital signs, available nursing notes, past medical history, past surgical history, family history and social history. Vital Signs-Reviewed the patient's vital signs. Patient Vitals for the past 12 hrs:   Temp Pulse Resp BP SpO2   01/12/23 1023 98.2 °F (36.8 °C) 88 16 119/75 96 %           Records Reviewed: Nursing notes reviewed    Provider Notes (Medical Decision Making:  Patient here for repeat rabies vaccine administration. Tolerated prior doses without difficulty. Finger healed well and back to normal.  Tolerated antibiotics without difficulty.   Will administer rabies vaccine and discharge home.    ED Course:   Initial assessment performed. The patients presenting problems have been discussed, and they are in agreement with the care plan formulated and outlined with them. I have encouraged them to ask questions as they arise throughout their visit. Progress note:    Pt noted to be feeling better and ready for discharge. Updated pt and/or family on all final lab and/or  imaging findings. Will follow up as instructed. All questions have been answered, pt voiced understanding and agreement with plan. Specific return precautions provided as well as instructions to return to the ED should sx worsen at any time. Vital signs stable for discharge. I have also put together some discharge instructions for them that include: 1) educational information regarding their diagnosis, 2) how to care for their diagnosis at home, as well a 3) list of reasons why they would want to return to the ED prior to their follow-up appointment, should their condition change. Written by Donna Faustin MD        Critical Care Time:   0    Disposition:  Discharge    PLAN:  1. Current Discharge Medication List        2. Follow-up Information       Follow up With Specialties Details Why Contact Info    Titus Arauz NP Nurse Practitioner Schedule an appointment as soon as possible for a visit in 1 week  [place] [address]  [address] [employer]  [phone]            Return to ED if worse     Diagnosis     Clinical Impression:   1. Encounter for repeat administration of rabies vaccination              Please note that this dictation was completed with [employer], the computer voice recognition software. Quite often unanticipated grammatical, syntax, homophones, and other interpretive errors are inadvertently transcribed by the computer software. Please disregard these errors. Please excuse any errors that have escaped final proofreading.

## 2023-06-09 DIAGNOSIS — F41.9 ANXIETY DISORDER, UNSPECIFIED TYPE: Primary | ICD-10-CM

## 2023-06-11 RX ORDER — CLONAZEPAM 0.5 MG/1
TABLET ORAL
Qty: 30 TABLET | Refills: 1 | Status: SHIPPED | OUTPATIENT
Start: 2023-06-11 | End: 2023-12-08

## 2023-06-16 RX ORDER — TRAZODONE HYDROCHLORIDE 100 MG/1
100 TABLET ORAL NIGHTLY
COMMUNITY
Start: 2023-05-09

## 2023-06-16 RX ORDER — DULOXETINE 40 MG/1
1 CAPSULE, DELAYED RELEASE ORAL DAILY
COMMUNITY
Start: 2023-06-13

## 2023-06-16 RX ORDER — LISINOPRIL AND HYDROCHLOROTHIAZIDE 20; 12.5 MG/1; MG/1
TABLET ORAL
Qty: 180 TABLET | Refills: 1 | Status: SHIPPED | OUTPATIENT
Start: 2023-06-16

## 2023-06-16 RX ORDER — LISINOPRIL AND HYDROCHLOROTHIAZIDE 20; 12.5 MG/1; MG/1
2 TABLET ORAL DAILY
COMMUNITY
Start: 2023-03-17 | End: 2023-06-16 | Stop reason: SDUPTHER

## 2023-06-16 RX ORDER — AMLODIPINE BESYLATE 5 MG/1
TABLET ORAL
COMMUNITY
Start: 2023-06-15

## 2023-06-16 RX ORDER — COVID-19 MOLECULAR TEST ASSAY
KIT MISCELLANEOUS
COMMUNITY
Start: 2023-04-19

## 2023-06-16 RX ORDER — ATORVASTATIN CALCIUM 20 MG/1
20 TABLET, FILM COATED ORAL DAILY
COMMUNITY
Start: 2023-04-14 | End: 2023-07-14

## 2023-06-16 NOTE — TELEPHONE ENCOUNTER
Patient requesting refill on     Requested Prescriptions     Pending Prescriptions Disp Refills    lisinopril-hydroCHLOROthiazide (PRINZIDE;ZESTORETIC) 20-12.5 MG per tablet [Pharmacy Med Name: LISINOPRIL-HCTZ 20/12.5MG TABLETS] 180 tablet      Sig: TAKE 2 TABLETS BY MOUTH DAILY         Last OV 11/8/22

## 2023-07-14 RX ORDER — ATORVASTATIN CALCIUM 20 MG/1
20 TABLET, FILM COATED ORAL DAILY
Qty: 90 TABLET | Refills: 3 | Status: SHIPPED | OUTPATIENT
Start: 2023-07-14

## 2023-09-18 RX ORDER — AMLODIPINE BESYLATE 5 MG/1
5 TABLET ORAL DAILY
Qty: 90 TABLET | Refills: 0 | Status: SHIPPED | OUTPATIENT
Start: 2023-09-18

## 2023-10-30 ENCOUNTER — OFFICE VISIT (OUTPATIENT)
Age: 63
End: 2023-10-30
Payer: COMMERCIAL

## 2023-10-30 VITALS
RESPIRATION RATE: 18 BRPM | DIASTOLIC BLOOD PRESSURE: 60 MMHG | OXYGEN SATURATION: 95 % | TEMPERATURE: 98.1 F | SYSTOLIC BLOOD PRESSURE: 120 MMHG | BODY MASS INDEX: 33.06 KG/M2 | HEIGHT: 67 IN | WEIGHT: 210.6 LBS | HEART RATE: 87 BPM

## 2023-10-30 DIAGNOSIS — Z13.1 ENCOUNTER FOR SCREENING EXAMINATION FOR IMPAIRED GLUCOSE REGULATION AND DIABETES MELLITUS: ICD-10-CM

## 2023-10-30 DIAGNOSIS — E78.2 MIXED HYPERLIPIDEMIA: ICD-10-CM

## 2023-10-30 DIAGNOSIS — Z11.59 SCREENING FOR VIRAL DISEASE: ICD-10-CM

## 2023-10-30 DIAGNOSIS — Z87.891 PERSONAL HISTORY OF TOBACCO USE: ICD-10-CM

## 2023-10-30 DIAGNOSIS — Z12.31 SCREENING MAMMOGRAM FOR BREAST CANCER: ICD-10-CM

## 2023-10-30 DIAGNOSIS — F41.9 ANXIETY: ICD-10-CM

## 2023-10-30 DIAGNOSIS — E55.9 VITAMIN D DEFICIENCY: ICD-10-CM

## 2023-10-30 DIAGNOSIS — I10 PRIMARY HYPERTENSION: ICD-10-CM

## 2023-10-30 DIAGNOSIS — Z00.00 WELLNESS EXAMINATION: Primary | ICD-10-CM

## 2023-10-30 LAB
ALBUMIN SERPL-MCNC: 4.2 G/DL (ref 3.5–5)
ALBUMIN/GLOB SERPL: 1.4 (ref 1.1–2.2)
ALP SERPL-CCNC: 76 U/L (ref 45–117)
ALT SERPL-CCNC: 25 U/L (ref 12–78)
ANION GAP SERPL CALC-SCNC: 8 MMOL/L (ref 5–15)
AST SERPL-CCNC: 15 U/L (ref 15–37)
BASOPHILS # BLD: 0.1 K/UL (ref 0–0.1)
BASOPHILS NFR BLD: 1 % (ref 0–1)
BILIRUB SERPL-MCNC: 0.3 MG/DL (ref 0.2–1)
BUN SERPL-MCNC: 16 MG/DL (ref 6–20)
BUN/CREAT SERPL: 23 (ref 12–20)
CALCIUM SERPL-MCNC: 9.5 MG/DL (ref 8.5–10.1)
CHLORIDE SERPL-SCNC: 105 MMOL/L (ref 97–108)
CHOLEST SERPL-MCNC: 189 MG/DL
CO2 SERPL-SCNC: 24 MMOL/L (ref 21–32)
CREAT SERPL-MCNC: 0.71 MG/DL (ref 0.55–1.02)
DIFFERENTIAL METHOD BLD: ABNORMAL
EOSINOPHIL # BLD: 0.2 K/UL (ref 0–0.4)
EOSINOPHIL NFR BLD: 2 % (ref 0–7)
ERYTHROCYTE [DISTWIDTH] IN BLOOD BY AUTOMATED COUNT: 14.8 % (ref 11.5–14.5)
EST. AVERAGE GLUCOSE BLD GHB EST-MCNC: 134 MG/DL
GLOBULIN SER CALC-MCNC: 3.1 G/DL (ref 2–4)
GLUCOSE SERPL-MCNC: 97 MG/DL (ref 65–100)
HBA1C MFR BLD: 6.3 % (ref 4–5.6)
HCT VFR BLD AUTO: 45 % (ref 35–47)
HDLC SERPL-MCNC: 54 MG/DL
HDLC SERPL: 3.5 (ref 0–5)
HGB BLD-MCNC: 14.1 G/DL (ref 11.5–16)
IMM GRANULOCYTES # BLD AUTO: 0.1 K/UL (ref 0–0.04)
IMM GRANULOCYTES NFR BLD AUTO: 1 % (ref 0–0.5)
LDLC SERPL CALC-MCNC: 105 MG/DL (ref 0–100)
LYMPHOCYTES # BLD: 3.6 K/UL (ref 0.8–3.5)
LYMPHOCYTES NFR BLD: 35 % (ref 12–49)
MCH RBC QN AUTO: 27 PG (ref 26–34)
MCHC RBC AUTO-ENTMCNC: 31.3 G/DL (ref 30–36.5)
MCV RBC AUTO: 86.2 FL (ref 80–99)
MONOCYTES # BLD: 0.8 K/UL (ref 0–1)
MONOCYTES NFR BLD: 8 % (ref 5–13)
NEUTS SEG # BLD: 5.6 K/UL (ref 1.8–8)
NEUTS SEG NFR BLD: 53 % (ref 32–75)
NRBC # BLD: 0 K/UL (ref 0–0.01)
NRBC BLD-RTO: 0 PER 100 WBC
PLATELET # BLD AUTO: 288 K/UL (ref 150–400)
PMV BLD AUTO: 10.6 FL (ref 8.9–12.9)
POTASSIUM SERPL-SCNC: 3.9 MMOL/L (ref 3.5–5.1)
PROT SERPL-MCNC: 7.3 G/DL (ref 6.4–8.2)
RBC # BLD AUTO: 5.22 M/UL (ref 3.8–5.2)
SODIUM SERPL-SCNC: 137 MMOL/L (ref 136–145)
TRIGL SERPL-MCNC: 150 MG/DL
TSH SERPL DL<=0.05 MIU/L-ACNC: 1.01 UIU/ML (ref 0.36–3.74)
VLDLC SERPL CALC-MCNC: 30 MG/DL
WBC # BLD AUTO: 10.4 K/UL (ref 3.6–11)

## 2023-10-30 PROCEDURE — 36415 COLL VENOUS BLD VENIPUNCTURE: CPT | Performed by: NURSE PRACTITIONER

## 2023-10-30 PROCEDURE — G0296 VISIT TO DETERM LDCT ELIG: HCPCS | Performed by: NURSE PRACTITIONER

## 2023-10-30 PROCEDURE — 3078F DIAST BP <80 MM HG: CPT | Performed by: NURSE PRACTITIONER

## 2023-10-30 PROCEDURE — 3074F SYST BP LT 130 MM HG: CPT | Performed by: NURSE PRACTITIONER

## 2023-10-30 PROCEDURE — 99396 PREV VISIT EST AGE 40-64: CPT | Performed by: NURSE PRACTITIONER

## 2023-10-30 RX ORDER — TRAZODONE HYDROCHLORIDE 100 MG/1
100 TABLET ORAL NIGHTLY
Qty: 90 TABLET | Refills: 3 | Status: SHIPPED | OUTPATIENT
Start: 2023-10-30

## 2023-10-30 RX ORDER — ESCITALOPRAM OXALATE 10 MG/1
10 TABLET ORAL DAILY
Qty: 90 TABLET | Refills: 1 | Status: SHIPPED | OUTPATIENT
Start: 2023-10-30

## 2023-10-30 RX ORDER — DULOXETIN HYDROCHLORIDE 20 MG/1
20 CAPSULE, DELAYED RELEASE ORAL DAILY
Qty: 20 CAPSULE | Refills: 0 | Status: SHIPPED | OUTPATIENT
Start: 2023-10-30

## 2023-10-30 RX ORDER — CLONAZEPAM 1 MG/1
1 TABLET ORAL NIGHTLY PRN
Qty: 30 TABLET | Refills: 1 | Status: SHIPPED | OUTPATIENT
Start: 2023-10-30 | End: 2024-01-28

## 2023-10-30 SDOH — ECONOMIC STABILITY: HOUSING INSECURITY
IN THE LAST 12 MONTHS, WAS THERE A TIME WHEN YOU DID NOT HAVE A STEADY PLACE TO SLEEP OR SLEPT IN A SHELTER (INCLUDING NOW)?: NO

## 2023-10-30 SDOH — HEALTH STABILITY: PHYSICAL HEALTH: ON AVERAGE, HOW MANY MINUTES DO YOU ENGAGE IN EXERCISE AT THIS LEVEL?: 0 MIN

## 2023-10-30 SDOH — HEALTH STABILITY: PHYSICAL HEALTH: ON AVERAGE, HOW MANY DAYS PER WEEK DO YOU ENGAGE IN MODERATE TO STRENUOUS EXERCISE (LIKE A BRISK WALK)?: 0 DAYS

## 2023-10-30 SDOH — ECONOMIC STABILITY: TRANSPORTATION INSECURITY
IN THE PAST 12 MONTHS, HAS THE LACK OF TRANSPORTATION KEPT YOU FROM MEDICAL APPOINTMENTS OR FROM GETTING MEDICATIONS?: NO

## 2023-10-30 SDOH — ECONOMIC STABILITY: FOOD INSECURITY: WITHIN THE PAST 12 MONTHS, THE FOOD YOU BOUGHT JUST DIDN'T LAST AND YOU DIDN'T HAVE MONEY TO GET MORE.: NEVER TRUE

## 2023-10-30 SDOH — ECONOMIC STABILITY: TRANSPORTATION INSECURITY
IN THE PAST 12 MONTHS, HAS LACK OF TRANSPORTATION KEPT YOU FROM MEETINGS, WORK, OR FROM GETTING THINGS NEEDED FOR DAILY LIVING?: NO

## 2023-10-30 SDOH — ECONOMIC STABILITY: INCOME INSECURITY: IN THE LAST 12 MONTHS, WAS THERE A TIME WHEN YOU WERE NOT ABLE TO PAY THE MORTGAGE OR RENT ON TIME?: NO

## 2023-10-30 SDOH — ECONOMIC STABILITY: FOOD INSECURITY: WITHIN THE PAST 12 MONTHS, YOU WORRIED THAT YOUR FOOD WOULD RUN OUT BEFORE YOU GOT MONEY TO BUY MORE.: NEVER TRUE

## 2023-10-30 ASSESSMENT — SOCIAL DETERMINANTS OF HEALTH (SDOH)
HOW OFTEN DO YOU GET TOGETHER WITH FRIENDS OR RELATIVES?: MORE THAN THREE TIMES A WEEK
WITHIN THE LAST YEAR, HAVE YOU BEEN KICKED, HIT, SLAPPED, OR OTHERWISE PHYSICALLY HURT BY YOUR PARTNER OR EX-PARTNER?: NO
WITHIN THE LAST YEAR, HAVE YOU BEEN HUMILIATED OR EMOTIONALLY ABUSED IN OTHER WAYS BY YOUR PARTNER OR EX-PARTNER?: NO
HOW HARD IS IT FOR YOU TO PAY FOR THE VERY BASICS LIKE FOOD, HOUSING, MEDICAL CARE, AND HEATING?: NOT HARD AT ALL
WITHIN THE LAST YEAR, HAVE YOU BEEN AFRAID OF YOUR PARTNER OR EX-PARTNER?: NO
WITHIN THE LAST YEAR, HAVE TO BEEN RAPED OR FORCED TO HAVE ANY KIND OF SEXUAL ACTIVITY BY YOUR PARTNER OR EX-PARTNER?: NO
IN A TYPICAL WEEK, HOW MANY TIMES DO YOU TALK ON THE PHONE WITH FAMILY, FRIENDS, OR NEIGHBORS?: MORE THAN THREE TIMES A WEEK
HOW OFTEN DO YOU ATTEND CHURCH OR RELIGIOUS SERVICES?: NEVER
HOW OFTEN DO YOU ATTENT MEETINGS OF THE CLUB OR ORGANIZATION YOU BELONG TO?: NEVER
DO YOU BELONG TO ANY CLUBS OR ORGANIZATIONS SUCH AS CHURCH GROUPS UNIONS, FRATERNAL OR ATHLETIC GROUPS, OR SCHOOL GROUPS?: NO

## 2023-10-30 ASSESSMENT — PATIENT HEALTH QUESTIONNAIRE - PHQ9
3. TROUBLE FALLING OR STAYING ASLEEP: 0
4. FEELING TIRED OR HAVING LITTLE ENERGY: 1
10. IF YOU CHECKED OFF ANY PROBLEMS, HOW DIFFICULT HAVE THESE PROBLEMS MADE IT FOR YOU TO DO YOUR WORK, TAKE CARE OF THINGS AT HOME, OR GET ALONG WITH OTHER PEOPLE: 0
7. TROUBLE CONCENTRATING ON THINGS, SUCH AS READING THE NEWSPAPER OR WATCHING TELEVISION: 0
2. FEELING DOWN, DEPRESSED OR HOPELESS: 1
SUM OF ALL RESPONSES TO PHQ QUESTIONS 1-9: 3
5. POOR APPETITE OR OVEREATING: 0
9. THOUGHTS THAT YOU WOULD BE BETTER OFF DEAD, OR OF HURTING YOURSELF: 0
1. LITTLE INTEREST OR PLEASURE IN DOING THINGS: 0
SUM OF ALL RESPONSES TO PHQ QUESTIONS 1-9: 3
SUM OF ALL RESPONSES TO PHQ QUESTIONS 1-9: 3
SUM OF ALL RESPONSES TO PHQ9 QUESTIONS 1 & 2: 1
6. FEELING BAD ABOUT YOURSELF - OR THAT YOU ARE A FAILURE OR HAVE LET YOURSELF OR YOUR FAMILY DOWN: 1
8. MOVING OR SPEAKING SO SLOWLY THAT OTHER PEOPLE COULD HAVE NOTICED. OR THE OPPOSITE, BEING SO FIGETY OR RESTLESS THAT YOU HAVE BEEN MOVING AROUND A LOT MORE THAN USUAL: 0
SUM OF ALL RESPONSES TO PHQ QUESTIONS 1-9: 3

## 2023-10-30 ASSESSMENT — COLUMBIA-SUICIDE SEVERITY RATING SCALE - C-SSRS
3. HAVE YOU BEEN THINKING ABOUT HOW YOU MIGHT KILL YOURSELF?: NO
7. DID THIS OCCUR IN THE LAST THREE MONTHS: NO
4. HAVE YOU HAD THESE THOUGHTS AND HAD SOME INTENTION OF ACTING ON THEM?: NO
5. HAVE YOU STARTED TO WORK OUT OR WORKED OUT THE DETAILS OF HOW TO KILL YOURSELF? DO YOU INTEND TO CARRY OUT THIS PLAN?: NO

## 2023-10-30 ASSESSMENT — LIFESTYLE VARIABLES
HOW OFTEN DO YOU HAVE A DRINK CONTAINING ALCOHOL: MONTHLY OR LESS
HOW MANY STANDARD DRINKS CONTAINING ALCOHOL DO YOU HAVE ON A TYPICAL DAY: 1 OR 2

## 2023-10-30 NOTE — PROGRESS NOTES
Chief Complaint   Patient presents with    Annual Exam       ASSESSMENT AND PLAN:      Diagnosis Orders   1. Wellness examination  PA COLLECTION VENOUS BLOOD VENIPUNCTURE    CBC with Auto Differential    Comprehensive Metabolic Panel    Lipid Panel    TSH    Vitamin D 25 Hydroxy    Hemoglobin A1C    HIV 1/2 Ag/Ab, 4TH Generation,W Rflx Confirm    MARY GENESIS DIGITAL SCREEN BILATERAL    DULoxetine (CYMBALTA) 20 MG extended release capsule    clonazePAM (KLONOPIN) 1 MG tablet    escitalopram (LEXAPRO) 10 MG tablet    HIV 1/2 Ag/Ab, 4TH Generation,W Rflx Confirm    Hemoglobin A1C    Vitamin D 25 Hydroxy    TSH    Lipid Panel    Comprehensive Metabolic Panel    CBC with Auto Differential      2. Primary hypertension  PA COLLECTION VENOUS BLOOD VENIPUNCTURE    CBC with Auto Differential    Comprehensive Metabolic Panel    Lipid Panel    TSH    TSH    Lipid Panel    Comprehensive Metabolic Panel    CBC with Auto Differential      3. Anxiety  DULoxetine (CYMBALTA) 20 MG extended release capsule    clonazePAM (KLONOPIN) 1 MG tablet    escitalopram (LEXAPRO) 10 MG tablet      4. Mixed hyperlipidemia        5. Encounter for screening examination for impaired glucose regulation and diabetes mellitus  PA COLLECTION VENOUS BLOOD VENIPUNCTURE    Hemoglobin A1C    Hemoglobin A1C      6. Screening mammogram for breast cancer  [facility] GENES[facility] DIGITAL SCREEN BILATERAL      7. Vitamin D deficiency  PA COLLECTION VENOUS BLOOD VENIPUNCTURE    Vitamin D 25 Hydroxy    Vitamin D 25 Hydroxy      8. Screening for viral disease  PA COLLECTION VENOUS BLOOD VENIPUNCTURE    HIV 1/2 Ag/Ab, 4TH Generation,W Rflx Confirm    HIV 1/2 Ag/Ab, 4TH Generation,W Rflx Confirm      9. Personal history of tobacco use  PA VISIT TO DISCUSS LUNG CA SCREEN W LDCT    CT Lung Screen (Initial/Annual/Baseline)        Check up today. Mammogram at [facility]. LDCT at [address] [facility] [address] Nursing to fax order. Check up labs today. Dicussed mood. Transition from Cymbalta to Lexapro.

## 2023-10-30 NOTE — PATIENT INSTRUCTIONS
will have to decide how or whether to treat it. Some lung cancers found on CT scans are harmless and would not have caused a problem if they had not been found through screening. But because doctors can't tell which ones will turn out to be harmless, most will be treated. This means that you may get treatment--including surgery, radiation, or chemotherapy--that you don't need. There is a risk of damage to cells or tissue from being exposed to radiation, including the small amounts used in CTs, X-rays, and other medical tests. Over time, exposure to radiation may cause cancer and other health problems. But in most cases, the risk of getting cancer from being exposed to small amounts of radiation is low. It's not a reason to avoid these tests for most people. What are the benefits of screening? Your scan may be normal (negative). For some people who are at higher risk, screening lowers the chance of dying of lung cancer. How much and how long you smoked helps to determine your risk level. Screening can find some cancers early, when treatment may be more likely to work. What happens after screening? The results of your CT scan will be sent to your doctor. Someone from your care team will explain the results of your scan and answer any questions you may have. If you need any follow-up, he or she will help you understand what to do next. After a lung cancer screening, you can go back to your usual activities right away. A lung cancer screening test can't tell if you have lung cancer. If your results are positive, your doctor can't tell whether an abnormal finding is a harmless nodule, cancer, or something else without doing more tests. What can you do to help prevent lung cancer? Some lung cancers can't be prevented. But if you smoke, quitting smoking is the best step you can take to prevent lung cancer. If you want to quit, your doctor can recommend medicines or other ways to help.   Follow-up care is a key

## 2023-10-31 LAB
25(OH)D3 SERPL-MCNC: 28.4 NG/ML (ref 30–100)
HIV 1+2 AB+HIV1 P24 AG SERPL QL IA: NONREACTIVE
HIV 1/2 RESULT COMMENT: NORMAL

## 2023-10-31 RX ORDER — TRAZODONE HYDROCHLORIDE 100 MG/1
100 TABLET ORAL
Qty: 90 TABLET | OUTPATIENT
Start: 2023-10-31

## 2023-11-18 DIAGNOSIS — F41.9 ANXIETY: ICD-10-CM

## 2023-11-18 DIAGNOSIS — Z00.00 WELLNESS EXAMINATION: ICD-10-CM

## 2023-11-20 RX ORDER — DULOXETIN HYDROCHLORIDE 20 MG/1
20 CAPSULE, DELAYED RELEASE ORAL DAILY
Qty: 20 CAPSULE | Refills: 0 | OUTPATIENT
Start: 2023-11-20

## 2024-01-23 ENCOUNTER — HOSPITAL ENCOUNTER (OUTPATIENT)
Facility: HOSPITAL | Age: 64
Discharge: HOME | End: 2024-01-26
Payer: COMMERCIAL

## 2024-01-23 DIAGNOSIS — Z00.00 WELLNESS EXAMINATION: ICD-10-CM

## 2024-01-23 DIAGNOSIS — Z12.31 SCREENING MAMMOGRAM FOR BREAST CANCER: ICD-10-CM

## 2024-01-23 PROCEDURE — 77063 BREAST TOMOSYNTHESIS BI: CPT

## 2024-01-25 NOTE — RESULT ENCOUNTER NOTE
Patient verified stating name and date of birth.  Patient informed of mammogram results and states understanding. FYI she wanted to know she has her lung scan 2-

## 2024-01-31 ENCOUNTER — TELEPHONE (OUTPATIENT)
Age: 64
End: 2024-01-31

## 2024-01-31 NOTE — TELEPHONE ENCOUNTER
----- Message from Lucita Campbell sent at 1/31/2024  9:10 AM EST -----  Subject: Message to Provider    QUESTIONS  Information for Provider? patient would like provider needs a note stating   that they are being treated for anxiety and depression so that they can be   excused from Jury duty, sees provider Francia, please call when ready   for   ---------------------------------------------------------------------------  --------------  CALL BACK INFO  [phone]; OK to leave message on voicemail  ---------------------------------------------------------------------------  --------------  SCRIPT ANSWERS  Relationship to Patient? Self

## 2024-02-23 ENCOUNTER — TELEPHONE (OUTPATIENT)
Age: 64
End: 2024-02-23

## 2024-02-23 NOTE — TELEPHONE ENCOUNTER
Asia Humphrey is requesting a call back to discuss recent imaging results. LT Lung Screening done at [facility].    CB# [phone]

## 2024-02-26 NOTE — TELEPHONE ENCOUNTER
Patient verified by stating name and date of birth. Patient informed of CT results and states understanding per Michelle FeldmanNP

## 2024-02-26 NOTE — TELEPHONE ENCOUNTER
Please call the patient. I just received her results today (2/26). She has very small pulmonary nodules that appear to be benign, repeat lung scan in 1 year. She has some plaque build up in her arteries. Keeping her weight down, BP down, and cholesterol down are key to preventing any progression. This was seen on her scan. Just an FYI.

## 2024-02-28 DIAGNOSIS — F41.9 ANXIETY: ICD-10-CM

## 2024-02-28 DIAGNOSIS — Z00.00 WELLNESS EXAMINATION: ICD-10-CM

## 2024-02-29 RX ORDER — CLONAZEPAM 1 MG/1
TABLET ORAL
Qty: 90 TABLET | Refills: 0 | Status: SHIPPED | OUTPATIENT
Start: 2024-02-29 | End: 2024-05-29

## 2024-04-21 DIAGNOSIS — F41.9 ANXIETY: ICD-10-CM

## 2024-04-21 DIAGNOSIS — Z00.00 WELLNESS EXAMINATION: ICD-10-CM

## 2024-04-22 RX ORDER — ESCITALOPRAM OXALATE 10 MG/1
10 TABLET ORAL DAILY
Qty: 90 TABLET | Refills: 1 | Status: SHIPPED | OUTPATIENT
Start: 2024-04-22

## 2024-04-22 NOTE — TELEPHONE ENCOUNTER
Patient requesting refill on     Requested Prescriptions     Pending Prescriptions Disp Refills    escitalopram (LEXAPRO) 10 MG tablet [Pharmacy Med Name: ESCITALOPRAM 10MG TABLETS] 90 tablet 1     Sig: TAKE 1 TABLET BY MOUTH DAILY        Last OV 10/30/2023

## 2024-07-11 DIAGNOSIS — F41.9 ANXIETY: ICD-10-CM

## 2024-07-11 DIAGNOSIS — Z00.00 WELLNESS EXAMINATION: ICD-10-CM

## 2024-07-13 RX ORDER — ATORVASTATIN CALCIUM 20 MG/1
20 TABLET, FILM COATED ORAL DAILY
Qty: 90 TABLET | Refills: 3 | Status: SHIPPED | OUTPATIENT
Start: 2024-07-13

## 2024-07-13 RX ORDER — CLONAZEPAM 1 MG/1
1 TABLET ORAL NIGHTLY PRN
Qty: 90 TABLET | Refills: 0 | Status: SHIPPED | OUTPATIENT
Start: 2024-07-13 | End: 2024-10-11

## 2024-09-12 ENCOUNTER — TELEPHONE (OUTPATIENT)
Age: 64
End: 2024-09-12

## 2024-09-16 ENCOUNTER — OFFICE VISIT (OUTPATIENT)
Age: 64
End: 2024-09-16
Payer: COMMERCIAL

## 2024-09-16 VITALS
BODY MASS INDEX: 33.53 KG/M2 | SYSTOLIC BLOOD PRESSURE: 130 MMHG | OXYGEN SATURATION: 94 % | TEMPERATURE: 97.8 F | WEIGHT: 213.6 LBS | RESPIRATION RATE: 20 BRPM | DIASTOLIC BLOOD PRESSURE: 72 MMHG | HEIGHT: 67 IN | HEART RATE: 92 BPM

## 2024-09-16 DIAGNOSIS — F90.9 ATTENTION DEFICIT HYPERACTIVITY DISORDER (ADHD), UNSPECIFIED ADHD TYPE: ICD-10-CM

## 2024-09-16 DIAGNOSIS — Z23 NEEDS FLU SHOT: Primary | ICD-10-CM

## 2024-09-16 DIAGNOSIS — F33.9 EPISODE OF RECURRENT MAJOR DEPRESSIVE DISORDER, UNSPECIFIED DEPRESSION EPISODE SEVERITY (HCC): ICD-10-CM

## 2024-09-16 DIAGNOSIS — F41.9 ANXIETY: ICD-10-CM

## 2024-09-16 PROCEDURE — 90661 CCIIV3 VAC ABX FR 0.5 ML IM: CPT | Performed by: NURSE PRACTITIONER

## 2024-09-16 PROCEDURE — 3075F SYST BP GE 130 - 139MM HG: CPT | Performed by: NURSE PRACTITIONER

## 2024-09-16 PROCEDURE — 90471 IMMUNIZATION ADMIN: CPT | Performed by: NURSE PRACTITIONER

## 2024-09-16 PROCEDURE — 99213 OFFICE O/P EST LOW 20 MIN: CPT | Performed by: NURSE PRACTITIONER

## 2024-09-16 PROCEDURE — 3078F DIAST BP <80 MM HG: CPT | Performed by: NURSE PRACTITIONER

## 2024-09-16 ASSESSMENT — PATIENT HEALTH QUESTIONNAIRE - PHQ9
2. FEELING DOWN, DEPRESSED OR HOPELESS: NOT AT ALL
SUM OF ALL RESPONSES TO PHQ QUESTIONS 1-9: 0
7. TROUBLE CONCENTRATING ON THINGS, SUCH AS READING THE NEWSPAPER OR WATCHING TELEVISION: NOT AT ALL
6. FEELING BAD ABOUT YOURSELF - OR THAT YOU ARE A FAILURE OR HAVE LET YOURSELF OR YOUR FAMILY DOWN: NOT AT ALL
10. IF YOU CHECKED OFF ANY PROBLEMS, HOW DIFFICULT HAVE THESE PROBLEMS MADE IT FOR YOU TO DO YOUR WORK, TAKE CARE OF THINGS AT HOME, OR GET ALONG WITH OTHER PEOPLE: NOT DIFFICULT AT ALL
4. FEELING TIRED OR HAVING LITTLE ENERGY: NOT AT ALL
SUM OF ALL RESPONSES TO PHQ QUESTIONS 1-9: 0
SUM OF ALL RESPONSES TO PHQ9 QUESTIONS 1 & 2: 0
5. POOR APPETITE OR OVEREATING: NOT AT ALL
SUM OF ALL RESPONSES TO PHQ QUESTIONS 1-9: 0
1. LITTLE INTEREST OR PLEASURE IN DOING THINGS: NOT AT ALL
9. THOUGHTS THAT YOU WOULD BE BETTER OFF DEAD, OR OF HURTING YOURSELF: NOT AT ALL
SUM OF ALL RESPONSES TO PHQ QUESTIONS 1-9: 0
3. TROUBLE FALLING OR STAYING ASLEEP: NOT AT ALL
8. MOVING OR SPEAKING SO SLOWLY THAT OTHER PEOPLE COULD HAVE NOTICED. OR THE OPPOSITE, BEING SO FIGETY OR RESTLESS THAT YOU HAVE BEEN MOVING AROUND A LOT MORE THAN USUAL: NOT AT ALL

## 2024-10-19 DIAGNOSIS — F41.9 ANXIETY: ICD-10-CM

## 2024-10-19 DIAGNOSIS — Z00.00 WELLNESS EXAMINATION: ICD-10-CM

## 2024-10-21 RX ORDER — ESCITALOPRAM OXALATE 10 MG/1
10 TABLET ORAL DAILY
Qty: 90 TABLET | Refills: 1 | Status: SHIPPED | OUTPATIENT
Start: 2024-10-21

## 2024-10-21 RX ORDER — TRAZODONE HYDROCHLORIDE 100 MG/1
100 TABLET ORAL NIGHTLY
Qty: 90 TABLET | Refills: 3 | Status: SHIPPED | OUTPATIENT
Start: 2024-10-21

## 2024-11-25 ENCOUNTER — OFFICE VISIT (OUTPATIENT)
Age: 64
End: 2024-11-25

## 2024-11-25 VITALS
TEMPERATURE: 97.8 F | HEART RATE: 102 BPM | BODY MASS INDEX: 34.09 KG/M2 | DIASTOLIC BLOOD PRESSURE: 76 MMHG | RESPIRATION RATE: 20 BRPM | WEIGHT: 217.2 LBS | SYSTOLIC BLOOD PRESSURE: 128 MMHG | HEIGHT: 67 IN | OXYGEN SATURATION: 94 %

## 2024-11-25 DIAGNOSIS — R73.03 PREDIABETES: ICD-10-CM

## 2024-11-25 DIAGNOSIS — F41.9 ANXIETY: ICD-10-CM

## 2024-11-25 DIAGNOSIS — H61.23 BILATERAL IMPACTED CERUMEN: ICD-10-CM

## 2024-11-25 DIAGNOSIS — Z12.31 SCREENING MAMMOGRAM FOR BREAST CANCER: ICD-10-CM

## 2024-11-25 DIAGNOSIS — I10 PRIMARY HYPERTENSION: ICD-10-CM

## 2024-11-25 DIAGNOSIS — Z00.00 WELLNESS EXAMINATION: Primary | ICD-10-CM

## 2024-11-25 DIAGNOSIS — Z87.891 PERSONAL HISTORY OF TOBACCO USE: ICD-10-CM

## 2024-11-25 DIAGNOSIS — F32.A ANXIETY AND DEPRESSION: ICD-10-CM

## 2024-11-25 DIAGNOSIS — F41.9 ANXIETY AND DEPRESSION: ICD-10-CM

## 2024-11-25 PROCEDURE — 36415 COLL VENOUS BLD VENIPUNCTURE: CPT | Performed by: NURSE PRACTITIONER

## 2024-11-25 PROCEDURE — 3074F SYST BP LT 130 MM HG: CPT | Performed by: NURSE PRACTITIONER

## 2024-11-25 PROCEDURE — G0296 VISIT TO DETERM LDCT ELIG: HCPCS | Performed by: NURSE PRACTITIONER

## 2024-11-25 PROCEDURE — 69209 REMOVE IMPACTED EAR WAX UNI: CPT | Performed by: NURSE PRACTITIONER

## 2024-11-25 PROCEDURE — 3078F DIAST BP <80 MM HG: CPT | Performed by: NURSE PRACTITIONER

## 2024-11-25 PROCEDURE — 99396 PREV VISIT EST AGE 40-64: CPT | Performed by: NURSE PRACTITIONER

## 2024-11-25 RX ORDER — ATORVASTATIN CALCIUM 20 MG/1
20 TABLET, FILM COATED ORAL DAILY
Qty: 90 TABLET | Refills: 3 | Status: SHIPPED | OUTPATIENT
Start: 2024-11-25

## 2024-11-25 RX ORDER — LISINOPRIL AND HYDROCHLOROTHIAZIDE 12.5; 2 MG/1; MG/1
2 TABLET ORAL DAILY
Qty: 180 TABLET | Refills: 3 | Status: SHIPPED | OUTPATIENT
Start: 2024-11-25

## 2024-11-25 RX ORDER — AMLODIPINE BESYLATE 5 MG/1
5 TABLET ORAL DAILY
Qty: 90 TABLET | Refills: 3 | Status: SHIPPED | OUTPATIENT
Start: 2024-11-25

## 2024-11-25 RX ORDER — ESCITALOPRAM OXALATE 10 MG/1
10 TABLET ORAL DAILY
Qty: 90 TABLET | Refills: 3 | Status: SHIPPED | OUTPATIENT
Start: 2024-11-25

## 2024-11-25 RX ORDER — ALBUTEROL SULFATE 90 UG/1
2 INHALANT RESPIRATORY (INHALATION) 4 TIMES DAILY PRN
Qty: 18 G | Refills: 0 | Status: SHIPPED | OUTPATIENT
Start: 2024-11-25

## 2024-11-25 RX ORDER — TRAZODONE HYDROCHLORIDE 100 MG/1
100 TABLET ORAL NIGHTLY
Qty: 90 TABLET | Refills: 3 | Status: SHIPPED | OUTPATIENT
Start: 2024-11-25

## 2024-11-25 SDOH — SOCIAL STABILITY: SOCIAL NETWORK: ARE YOU MARRIED, WIDOWED, DIVORCED, SEPARATED, NEVER MARRIED, OR LIVING WITH A PARTNER?: WIDOWED

## 2024-11-25 SDOH — SOCIAL STABILITY: SOCIAL INSECURITY: WITHIN THE LAST YEAR, HAVE YOU BEEN AFRAID OF YOUR PARTNER OR EX-PARTNER?: NO

## 2024-11-25 SDOH — SOCIAL STABILITY: SOCIAL NETWORK
DO YOU BELONG TO ANY CLUBS OR ORGANIZATIONS SUCH AS CHURCH GROUPS UNIONS, FRATERNAL OR ATHLETIC GROUPS, OR SCHOOL GROUPS?: NO

## 2024-11-25 SDOH — SOCIAL STABILITY: SOCIAL INSECURITY
WITHIN THE LAST YEAR, HAVE YOU BEEN KICKED, HIT, SLAPPED, OR OTHERWISE PHYSICALLY HURT BY YOUR PARTNER OR EX-PARTNER?: NO

## 2024-11-25 SDOH — ECONOMIC STABILITY: INCOME INSECURITY: IN THE LAST 12 MONTHS, WAS THERE A TIME WHEN YOU WERE NOT ABLE TO PAY THE MORTGAGE OR RENT ON TIME?: NO

## 2024-11-25 SDOH — ECONOMIC STABILITY: FOOD INSECURITY: WITHIN THE PAST 12 MONTHS, YOU WORRIED THAT YOUR FOOD WOULD RUN OUT BEFORE YOU GOT MONEY TO BUY MORE.: NEVER TRUE

## 2024-11-25 SDOH — ECONOMIC STABILITY: FOOD INSECURITY: WITHIN THE PAST 12 MONTHS, THE FOOD YOU BOUGHT JUST DIDN'T LAST AND YOU DIDN'T HAVE MONEY TO GET MORE.: NEVER TRUE

## 2024-11-25 SDOH — HEALTH STABILITY: MENTAL HEALTH
STRESS IS WHEN SOMEONE FEELS TENSE, NERVOUS, ANXIOUS, OR CAN'T SLEEP AT NIGHT BECAUSE THEIR MIND IS TROUBLED. HOW STRESSED ARE YOU?: TO SOME EXTENT

## 2024-11-25 SDOH — HEALTH STABILITY: MENTAL HEALTH: HOW OFTEN DO YOU HAVE A DRINK CONTAINING ALCOHOL?: NEVER

## 2024-11-25 SDOH — SOCIAL STABILITY: SOCIAL NETWORK: HOW OFTEN DO YOU ATTEND CHURCH OR RELIGIOUS SERVICES?: NEVER

## 2024-11-25 SDOH — SOCIAL STABILITY: SOCIAL INSECURITY
WITHIN THE LAST YEAR, HAVE TO BEEN RAPED OR FORCED TO HAVE ANY KIND OF SEXUAL ACTIVITY BY YOUR PARTNER OR EX-PARTNER?: NO

## 2024-11-25 SDOH — SOCIAL STABILITY: SOCIAL NETWORK: HOW OFTEN DO YOU GET TOGETHER WITH FRIENDS OR RELATIVES?: MORE THAN THREE TIMES A WEEK

## 2024-11-25 SDOH — ECONOMIC STABILITY: INCOME INSECURITY: HOW HARD IS IT FOR YOU TO PAY FOR THE VERY BASICS LIKE FOOD, HOUSING, MEDICAL CARE, AND HEATING?: NOT HARD AT ALL

## 2024-11-25 SDOH — SOCIAL STABILITY: SOCIAL INSECURITY: WITHIN THE LAST YEAR, HAVE YOU BEEN HUMILIATED OR EMOTIONALLY ABUSED IN OTHER WAYS BY YOUR PARTNER OR EX-PARTNER?: NO

## 2024-11-25 SDOH — SOCIAL STABILITY: SOCIAL NETWORK: HOW OFTEN DO YOU ATTENT MEETINGS OF THE CLUB OR ORGANIZATION YOU BELONG TO?: NEVER

## 2024-11-25 SDOH — HEALTH STABILITY: MENTAL HEALTH: HOW MANY STANDARD DRINKS CONTAINING ALCOHOL DO YOU HAVE ON A TYPICAL DAY?: PATIENT DOES NOT DRINK

## 2024-11-25 SDOH — HEALTH STABILITY: PHYSICAL HEALTH: ON AVERAGE, HOW MANY MINUTES DO YOU ENGAGE IN EXERCISE AT THIS LEVEL?: 120 MIN

## 2024-11-25 SDOH — HEALTH STABILITY: PHYSICAL HEALTH: ON AVERAGE, HOW MANY DAYS PER WEEK DO YOU ENGAGE IN MODERATE TO STRENUOUS EXERCISE (LIKE A BRISK WALK)?: 7 DAYS

## 2024-11-25 SDOH — SOCIAL STABILITY: SOCIAL NETWORK
IN A TYPICAL WEEK, HOW MANY TIMES DO YOU TALK ON THE PHONE WITH FAMILY, FRIENDS, OR NEIGHBORS?: MORE THAN THREE TIMES A WEEK

## 2024-11-25 ASSESSMENT — PATIENT HEALTH QUESTIONNAIRE - PHQ9
SUM OF ALL RESPONSES TO PHQ QUESTIONS 1-9: 0
9. THOUGHTS THAT YOU WOULD BE BETTER OFF DEAD, OR OF HURTING YOURSELF: NOT AT ALL
2. FEELING DOWN, DEPRESSED OR HOPELESS: NOT AT ALL
SUM OF ALL RESPONSES TO PHQ9 QUESTIONS 1 & 2: 0
10. IF YOU CHECKED OFF ANY PROBLEMS, HOW DIFFICULT HAVE THESE PROBLEMS MADE IT FOR YOU TO DO YOUR WORK, TAKE CARE OF THINGS AT HOME, OR GET ALONG WITH OTHER PEOPLE: NOT DIFFICULT AT ALL
SUM OF ALL RESPONSES TO PHQ QUESTIONS 1-9: 0
3. TROUBLE FALLING OR STAYING ASLEEP: NOT AT ALL
5. POOR APPETITE OR OVEREATING: NOT AT ALL
1. LITTLE INTEREST OR PLEASURE IN DOING THINGS: NOT AT ALL
SUM OF ALL RESPONSES TO PHQ QUESTIONS 1-9: 0
6. FEELING BAD ABOUT YOURSELF - OR THAT YOU ARE A FAILURE OR HAVE LET YOURSELF OR YOUR FAMILY DOWN: NOT AT ALL
8. MOVING OR SPEAKING SO SLOWLY THAT OTHER PEOPLE COULD HAVE NOTICED. OR THE OPPOSITE, BEING SO FIGETY OR RESTLESS THAT YOU HAVE BEEN MOVING AROUND A LOT MORE THAN USUAL: NOT AT ALL
4. FEELING TIRED OR HAVING LITTLE ENERGY: NOT AT ALL
7. TROUBLE CONCENTRATING ON THINGS, SUCH AS READING THE NEWSPAPER OR WATCHING TELEVISION: NOT AT ALL
SUM OF ALL RESPONSES TO PHQ QUESTIONS 1-9: 0

## 2024-11-25 NOTE — PROGRESS NOTES
\"Have you been to the ER, urgent care clinic since your last visit?  Hospitalized since your last visit?\"    NO    “Have you seen or consulted any other health care providers outside our system since your last visit?”    NO           
  Diagnosis Date    Anxiety     Biceps tendon tear     Depression     Frequent headaches     Hypertension     Joint pain     Kidney stones     Menopause     MRSA infection 2013    knee    Nausea & vomiting     Palpitations     Thyroid disease     thyroid problem nodules on thyroid       Family History   Problem Relation Age of Onset    Parkinson's Disease Father     Hypertension Brother     Hypertension Maternal Grandmother     Stroke Maternal Grandmother     Heart Disease Paternal Grandmother     Cancer Other         luekemia    Cancer Mother         pancreatic    Hypertension Mother     Diabetes Father          Review of Systems    A comprehensive review of systems was negative except for that written in the HPI.    Patient is not experiencing chest pain radiating to the jaw and/or down the arms.    Physical Examination:    Vitals:    11/25/24 1024   BP: 128/76   Site: Right Upper Arm   Position: Sitting   Cuff Size: Medium Adult   Pulse: (!) 102   Resp: 20   Temp: 97.8 °F (36.6 °C)   TempSrc: Oral   SpO2: 94%   Weight: 98.5 kg (217 lb 3.2 oz)   Height: 1.702 m (5' 7\")        Body mass index is 34.02 kg/m².     Wt Readings from Last 3 Encounters:   11/25/24 98.5 kg (217 lb 3.2 oz)   09/16/24 96.9 kg (213 lb 9.6 oz)   10/30/23 95.5 kg (210 lb 9.6 oz)       Constitutional: WDWN Female in no acute distress  HENT:  NC/AT, TMs pearly gray, OP: clear  EYES: EOMI, PERRL  Neck:  Supple, no JVD, mass or bruit. No thyromegaly.  Respiratory:  Respirations even and unlabored without use of accessory muscles, CTA throughout without wheezes, rales, rubs or rhonchi. Symmetrical chest expansion.  Cardiac: RRR no clicks, murmurs, gallops, or rubs  Musculoskeletal:  No cyanosis, clubbing or edema of extremities. Moves all extremities without difficulty.   Neurologic:  Smooth, even gait without assistance, CN 2-12 grossly intact.    Skin: intact and warm to the touch, no rash   Lymphadenopathy: no cervical or supraclavicular

## 2024-11-26 LAB
25(OH)D3 SERPL-MCNC: 35.5 NG/ML (ref 30–100)
ALBUMIN SERPL-MCNC: 4.2 G/DL (ref 3.5–5)
ALBUMIN/GLOB SERPL: 1.4 (ref 1.1–2.2)
ALP SERPL-CCNC: 67 U/L (ref 45–117)
ALT SERPL-CCNC: 24 U/L (ref 12–78)
ANION GAP SERPL CALC-SCNC: 9 MMOL/L (ref 2–12)
AST SERPL-CCNC: 17 U/L (ref 15–37)
BASOPHILS # BLD: 0.1 K/UL (ref 0–0.1)
BASOPHILS NFR BLD: 1 % (ref 0–1)
BILIRUB SERPL-MCNC: 0.3 MG/DL (ref 0.2–1)
BUN SERPL-MCNC: 18 MG/DL (ref 6–20)
BUN/CREAT SERPL: 22 (ref 12–20)
CALCIUM SERPL-MCNC: 10.4 MG/DL (ref 8.5–10.1)
CHLORIDE SERPL-SCNC: 104 MMOL/L (ref 97–108)
CHOLEST SERPL-MCNC: 168 MG/DL
CO2 SERPL-SCNC: 26 MMOL/L (ref 21–32)
CREAT SERPL-MCNC: 0.81 MG/DL (ref 0.55–1.02)
DIFFERENTIAL METHOD BLD: ABNORMAL
EOSINOPHIL # BLD: 0.2 K/UL (ref 0–0.4)
EOSINOPHIL NFR BLD: 2 % (ref 0–7)
ERYTHROCYTE [DISTWIDTH] IN BLOOD BY AUTOMATED COUNT: 15.8 % (ref 11.5–14.5)
EST. AVERAGE GLUCOSE BLD GHB EST-MCNC: 137 MG/DL
GLOBULIN SER CALC-MCNC: 3 G/DL (ref 2–4)
GLUCOSE SERPL-MCNC: 94 MG/DL (ref 65–100)
HBA1C MFR BLD: 6.4 % (ref 4–5.6)
HCT VFR BLD AUTO: 40.6 % (ref 35–47)
HDLC SERPL-MCNC: 56 MG/DL
HDLC SERPL: 3 (ref 0–5)
HGB BLD-MCNC: 12.8 G/DL (ref 11.5–16)
IMM GRANULOCYTES # BLD AUTO: 0.1 K/UL (ref 0–0.04)
IMM GRANULOCYTES NFR BLD AUTO: 1 % (ref 0–0.5)
LDLC SERPL CALC-MCNC: 87.6 MG/DL (ref 0–100)
LYMPHOCYTES # BLD: 2.9 K/UL (ref 0.8–3.5)
LYMPHOCYTES NFR BLD: 28 % (ref 12–49)
MCH RBC QN AUTO: 27.1 PG (ref 26–34)
MCHC RBC AUTO-ENTMCNC: 31.5 G/DL (ref 30–36.5)
MCV RBC AUTO: 86 FL (ref 80–99)
MONOCYTES # BLD: 0.8 K/UL (ref 0–1)
MONOCYTES NFR BLD: 8 % (ref 5–13)
NEUTS SEG # BLD: 6.3 K/UL (ref 1.8–8)
NEUTS SEG NFR BLD: 60 % (ref 32–75)
NRBC # BLD: 0.02 K/UL (ref 0–0.01)
NRBC BLD-RTO: 0.2 PER 100 WBC
PLATELET # BLD AUTO: 292 K/UL (ref 150–400)
PMV BLD AUTO: 11.7 FL (ref 8.9–12.9)
POTASSIUM SERPL-SCNC: 3.9 MMOL/L (ref 3.5–5.1)
PROT SERPL-MCNC: 7.2 G/DL (ref 6.4–8.2)
RBC # BLD AUTO: 4.72 M/UL (ref 3.8–5.2)
SODIUM SERPL-SCNC: 139 MMOL/L (ref 136–145)
TRIGL SERPL-MCNC: 122 MG/DL
TSH SERPL DL<=0.05 MIU/L-ACNC: 1.04 UIU/ML (ref 0.36–3.74)
VLDLC SERPL CALC-MCNC: 24.4 MG/DL
WBC # BLD AUTO: 10.3 K/UL (ref 3.6–11)

## 2024-12-05 DIAGNOSIS — Z00.00 WELLNESS EXAMINATION: ICD-10-CM

## 2024-12-05 DIAGNOSIS — F41.9 ANXIETY: ICD-10-CM

## 2024-12-06 RX ORDER — CLONAZEPAM 1 MG/1
TABLET ORAL
Qty: 90 TABLET | Refills: 0 | Status: SHIPPED | OUTPATIENT
Start: 2024-12-06 | End: 2025-01-06

## 2024-12-06 NOTE — TELEPHONE ENCOUNTER
Patient requesting refill on     Requested Prescriptions     Pending Prescriptions Disp Refills    clonazePAM (KLONOPIN) 1 MG tablet [Pharmacy Med Name: CLONAZEPAM 1MG TABLETS] 90 tablet 0     Sig: TAKE 1 TABLET BY MOUTH EVERY NIGHT AS NEEDED FOR ANXIETY. MAX DAILY AMOUNT: 1 MG        Last OV 11/25/2024

## 2024-12-26 DIAGNOSIS — Z00.00 WELLNESS EXAMINATION: ICD-10-CM

## 2024-12-27 RX ORDER — ALBUTEROL SULFATE 90 UG/1
INHALANT RESPIRATORY (INHALATION)
Qty: 18 G | Refills: 0 | Status: SHIPPED | OUTPATIENT
Start: 2024-12-27

## 2025-01-27 DIAGNOSIS — Z00.00 WELLNESS EXAMINATION: ICD-10-CM

## 2025-01-27 RX ORDER — ALBUTEROL SULFATE 90 UG/1
INHALANT RESPIRATORY (INHALATION)
Qty: 18 G | Refills: 0 | Status: SHIPPED | OUTPATIENT
Start: 2025-01-27

## 2025-04-10 DIAGNOSIS — F41.9 ANXIETY: ICD-10-CM

## 2025-04-10 DIAGNOSIS — Z00.00 WELLNESS EXAMINATION: ICD-10-CM

## 2025-04-11 RX ORDER — ESCITALOPRAM OXALATE 10 MG/1
10 TABLET ORAL DAILY
Qty: 90 TABLET | Refills: 3 | Status: SHIPPED | OUTPATIENT
Start: 2025-04-11

## 2025-05-22 DIAGNOSIS — Z00.00 WELLNESS EXAMINATION: ICD-10-CM

## 2025-05-22 DIAGNOSIS — F41.9 ANXIETY: ICD-10-CM

## 2025-05-23 RX ORDER — CLONAZEPAM 1 MG/1
TABLET ORAL
Qty: 90 TABLET | OUTPATIENT
Start: 2025-05-23 | End: 2025-06-23

## 2025-05-27 DIAGNOSIS — Z00.00 WELLNESS EXAMINATION: ICD-10-CM

## 2025-05-27 DIAGNOSIS — F41.9 ANXIETY: ICD-10-CM

## 2025-05-27 RX ORDER — CLONAZEPAM 1 MG/1
TABLET ORAL
Qty: 90 TABLET | Refills: 0 | Status: SHIPPED | OUTPATIENT
Start: 2025-05-27 | End: 2025-08-27

## 2025-05-27 NOTE — TELEPHONE ENCOUNTER
Medication Refill Request    Asia Humphrey is requesting a refill of the following medication(s):   clonazePAM (KLONOPIN) 1 MG tablet   Please send refill to:    Last OV 11-25 states to return in a yr.     [id number] - [place], [place] - [address] - P [phone] - F [phone] [id number] [place] [address]  Phone: [phone] Fax: [phone]

## 2025-06-06 RX ORDER — METFORMIN HYDROCHLORIDE 500 MG/1
500 TABLET, EXTENDED RELEASE ORAL DAILY
Qty: 90 TABLET | Refills: 1 | Status: SHIPPED | OUTPATIENT
Start: 2025-06-06

## 2025-06-21 DIAGNOSIS — Z00.00 WELLNESS EXAMINATION: ICD-10-CM

## 2025-06-23 RX ORDER — ALBUTEROL SULFATE 90 UG/1
INHALANT RESPIRATORY (INHALATION)
Qty: 18 G | Refills: 0 | Status: SHIPPED | OUTPATIENT
Start: 2025-06-23